# Patient Record
Sex: FEMALE | Race: WHITE | Employment: FULL TIME | URBAN - METROPOLITAN AREA
[De-identification: names, ages, dates, MRNs, and addresses within clinical notes are randomized per-mention and may not be internally consistent; named-entity substitution may affect disease eponyms.]

---

## 2017-02-14 ENCOUNTER — MYC REFILL (OUTPATIENT)
Dept: INTERNAL MEDICINE | Facility: CLINIC | Age: 52
End: 2017-02-14

## 2017-02-14 DIAGNOSIS — F90.2 ADHD (ATTENTION DEFICIT HYPERACTIVITY DISORDER), COMBINED TYPE: ICD-10-CM

## 2017-02-16 RX ORDER — DEXTROAMPHETAMINE SACCHARATE, AMPHETAMINE ASPARTATE, DEXTROAMPHETAMINE SULFATE AND AMPHETAMINE SULFATE 7.5; 7.5; 7.5; 7.5 MG/1; MG/1; MG/1; MG/1
30 TABLET ORAL 2 TIMES DAILY
Qty: 60 TABLET | Refills: 0 | Status: SHIPPED | OUTPATIENT
Start: 2017-02-16 | End: 2017-04-25

## 2017-04-25 ENCOUNTER — MYC REFILL (OUTPATIENT)
Dept: INTERNAL MEDICINE | Facility: CLINIC | Age: 52
End: 2017-04-25

## 2017-04-25 DIAGNOSIS — F90.2 ADHD (ATTENTION DEFICIT HYPERACTIVITY DISORDER), COMBINED TYPE: ICD-10-CM

## 2017-04-25 RX ORDER — DEXTROAMPHETAMINE SACCHARATE, AMPHETAMINE ASPARTATE, DEXTROAMPHETAMINE SULFATE AND AMPHETAMINE SULFATE 7.5; 7.5; 7.5; 7.5 MG/1; MG/1; MG/1; MG/1
30 TABLET ORAL 2 TIMES DAILY
Qty: 60 TABLET | Refills: 0 | Status: SHIPPED | OUTPATIENT
Start: 2017-04-25 | End: 2017-06-28

## 2017-04-25 NOTE — TELEPHONE ENCOUNTER
Message from MyChart:  Original authorizing provider: RICKY Wasserman CNP would like a refill of the following medications:  amphetamine-dextroamphetamine (ADDERALL) 30 MG per tablet [RICKY Wasserman CNP]    Preferred pharmacy: [place], [place] - [address] [place] [address] [phone]    Comment:

## 2017-06-22 ENCOUNTER — OFFICE VISIT (OUTPATIENT)
Dept: ORTHOPEDICS | Facility: CLINIC | Age: 52
End: 2017-06-22

## 2017-06-22 VITALS — HEIGHT: 68 IN | WEIGHT: 218 LBS | BODY MASS INDEX: 33.04 KG/M2 | RESPIRATION RATE: 16 BRPM

## 2017-06-22 DIAGNOSIS — M79.672 LEFT FOOT PAIN: Primary | ICD-10-CM

## 2017-06-22 NOTE — PROGRESS NOTES
Attending Note:   I have personally examined this patient and have reviewed the clinical presentation and progress note with the fellow. I agree with the treatment plan as outlined. The plan was formulated with the fellow on the day of the patient's visit. I have reviewed all imaging with the fellow and agree with the findings in the documentation.     Whit Green MD, CAQ, CCD  [facility]  Sports Medicine and Bone Health

## 2017-06-22 NOTE — PROGRESS NOTES
Subjective:   Unique Garrison is a 52 year old female who is here complaining of left foot pain. She had a previous stress fracture on 1st metatarsal 26 years ago when she had started running.  She has been doing HIIT workouts for about 2 years at the [employer]  2 months ago she switched from wearing running shoes to cross training shoes which have less support.  Since then she has had pain in her lateral distal foot over the base of her 3rd and 4th toes.  The pain has stayed the same since starting.  It is painful when she wears non supportive shoes.  She has backed off training some which does help some.      Background:   Date of injury: NA   Duration of symptoms: 2 months  Mechanism of Injury: Insidious Onset; Activity Related running from cross training  Aggravating factors:  unsupportive shoes, toeing off  Relieving Factors: rest  Prior Evaluation: None    PAST MEDICAL, SOCIAL, SURGICAL AND FAMILY HISTORY: She  has a past medical history of ADD (attention deficit disorder); Amblyopia; Anxiety; Depression; Insomnia; Perimenopausal; Tendonitis, bicipital (5/4/2012); and Uterine fibroid.  She  has a past surgical history that includes tonsillectomy; ORAL SURGERY SINGLE TOOTH; gum grafting; [place] screening (1/13/2014); Lasik customvue bilateral (1/17/2014); and Arthroscopy shoulder rotator cuff repair (Right, 2014).  Her family history includes Arthritis in her paternal grandmother; C.A.D. in her father; CANCER in her brother, brother, and mother; Depression in her sister; Hypertension in her father; Lipids in her father; OSTEOPOROSIS in her mother; Prostate Cancer in her father. There is no history of Thyroid Disease, Neurologic Disorder, or Musculoskeletal Disorder.  She reports that she quit smoking about 4 years ago. Her smoking use included Cigarettes. She has a 15.00 pack-year smoking history. She has never used smokeless tobacco. She reports that she drinks alcohol. She reports that she does not use illicit  "drugs.    ALLERGIES: She is allergic to morphine hcl.    CURRENT MEDICATIONS: She has a current medication list which includes the following prescription(s): amphetamine-dextroamphetamine, tretinoin, paroxetine, aluminum chloride, and progesterone.     REVIEW OF SYSTEMS:  CONSTITUTIONAL:NEGATIVE for fever, chills, change in weight  INTEGUMENTARY/SKIN: NEGATIVE for worrisome rashes, moles or lesions  MUSCULOSKELETAL:See HPI above  NEURO: NEGATIVE for weakness, dizziness or paresthesias  All other systems reviewed and negative.       Exam:   Resp 16  Ht 5' 7.75\" (1.721 m)  Wt 218 lb (98.9 kg)  BMI 33.39 kg/m2  CONSTITUTIONIAL: healthy, alert and no distress  HEENT: NC/AT, no scleral icterus  SKIN: no suspicious lesions or rashes  GAIT: normal  CARDIO: no LE edema  LUNGS: breathing non labored  ABD: soft, non obese  NEUROLOGIC: No focal neuro deficits  PSYCHIATRIC: affect normal/bright and mentation appears normal.      MUSCULOSKELETAL:   Left foot: dorsal swelling without warmth or erythema, tenderness over 3rd/ 4th metatarsal heads, no webspace tenderness, mild pain with metatarsal foot squeeze, painful toe extension but good strength, pain reproduced with single leg heel raise, negative hop test    Exam: 3 views of the left foot dated 6/22/2017.    COMPARISON: None.    CLINICAL HISTORY: Pain.    FINDINGS: AP, oblique, and lateral views of the left foot were  obtained. Bones are well aligned. The joint spaces are  well-maintained. Minimal spurring at the left first distal metatarsal.  No displaced fractures.             Impression             IMPRESSION: No acute bone abnormality in the left foot.    RACHEL SHAIKH MD      US left foot: mild tenosynovitis of extensor tendons 3r5d/ 4th digit, small metatarsal phalange jts 3rd and 4th     Assessment/Plan:   Left foot pain  -will get MRI to further evaluate; no evidence of stress reaction one Xray, US inconclusive   -pt placed into short walking boot today for " comfort  -icing prn  -likely refer to PT on follow up after MRI    Patient seen and examined with attending physician, Dr. Green.    Tamra Murillo DO  Primary Care Sports Medicine Fellow

## 2017-06-22 NOTE — MR AVS SNAPSHOT
After Visit Summary   6/22/2017    Unique Garrison    MRN: [id number]           Patient Information     Date Of Birth          1965        Visit Information        Provider Department      6/22/2017 2:40 PM Tamra Murillo MD [facility] Sports Medicine        Today's Diagnoses     Left foot pain    -  1       Follow-ups after your visit        Your next 10 appointments already scheduled     Jun 25, 2017  7:00 AM CDT   (Arrive by 6:45 AM)   MR FOOT LEFT W/O CONTRAST with [id number]   [facility] [place] [facility] ([facility])    [address] [place] [id number] [phone]           Take your medicines as usual, unless your doctor tells you not to. Bring a list of your current medicines to your exam (including vitamins, minerals and over-the-counter drugs). Also bring the results of similar scans you may have had.  Please remove any body piercings and hair extensions before you arrive.  Follow your doctor s orders. If you do not, we may have to postpone your exam.  You will not have contrast for this exam. You do not need to do anything special to prepare.  The MRI machine uses a strong magnet. Please wear clothes without metal (snaps, zippers). A sweatsuit works well, or we may give you a hospital gown.   **IMPORTANT** THE INSTRUCTIONS BELOW ARE ONLY FOR THOSE PATIENTS WHO HAVE BEEN TOLD THEY WILL RECEIVE SEDATION OR GENERAL ANESTHESIA DURING THEIR MRI PROCEDURE:  IF YOU WILL RECEIVE SEDATION (take medicine to help you relax during your exam):   You must get the medicine from your doctor before you arrive. Bring the medicine to the exam. Do not take it at home.   Arrive one hour early. Bring someone who can take you home after the test. Your medicine will make you sleepy. After the exam, you may not drive, take a bus or take a taxi by yourself.   No eating 8 hours before your exam. You may have clear liquids up until 4 hours before your exam. (Clear  liquids include water, clear tea, black coffee and fruit juice without pulp.)  IF YOU WILL RECEIVE ANESTHESIA (be asleep for your exam):   Arrive 1 1/2 hours early. Bring someone who can take you home after the test. You may not drive, take a bus or take a taxi by yourself.   No eating 8 hours before your exam. You may have clear liquids up until 4 hours before your exam. (Clear liquids include water, clear tea, black coffee and fruit juice without pulp.)   You will spend four to five hours in the recovery room.  Please call the Imaging Department at your exam site with any questions.            Jul 12, 2017  8:15 AM CDT   (Arrive by 8:00 AM)   Return Visit with Whit Green MD   [facility] ([facility])    [address] [place] [id number] [phone]              Future tests that were ordered for you today     Open Future Orders        Priority Expected Expires Ordered    MR Foot Left w/o Contrast Routine  6/22/2018 6/22/2017            Who to contact     Please call your clinic at [phone] to:    Ask questions about your health    Make or cancel appointments    Discuss your medicines    Learn about your test results    Speak to your doctor   If you have compliments or concerns about an experience at your clinic, or if you wish to file a complaint, please contact [facility] Patient Relations at [phone] or email us at [email or web address]         Additional Information About Your Visit        [employer] Information     [employer] gives you secure access to your electronic health record. If you see a primary care provider, you can also send messages to your care team and make appointments. If you have questions, please call your primary care clinic.  If you do not have a primary care provider, please call [phone] and they will assist you.      [employer] is an electronic gateway that  "provides easy, online access to your medical records. With Built In, you can request a clinic appointment, read your test results, renew a prescription or communicate with your care team.     To access your existing account, please contact your [facility] or call [phone] for assistance.        Care EveryWhere ID     This is your Care EveryWhere ID. This could be used by other organizations to access your [facility] medical records  [id number]        Your Vitals Were     Respirations Height BMI (Body Mass Index)             16 5' 7.75\" (1.721 m) 33.39 kg/m2          Blood Pressure from Last 3 Encounters:   05/03/16 167/78   01/08/15 135/84   06/11/13 141/75    Weight from Last 3 Encounters:   06/22/17 218 lb (98.9 kg)   05/03/16 214 lb 4.8 oz (97.2 kg)   03/17/16 205 lb (93 kg)               Primary Care Provider Office Phone # Fax #    Bria WARD Cristina, APRN [phone]       PR[facility] [address] [place] [phone]  [facility] [address]        Equal Access to Services     KAYDEN BHAKTA : Hadii aad ku hadasho Soomaali, waaxda luqadaha, qaybta kaalmada adedebrayada, gurwinder moffett . So [facility] [phone].    ATENCIÓN: Si habla español, tiene a randolph disposición servicios gratuitos de asistencia lingüística. Markame al [phone].    We comply with applicable federal civil rights laws and [place] laws. We do not discriminate on the basis of race, color, national origin, age, disability sex, sexual orientation or gender identity.            Thank you!     Thank you for choosing [facility] impok [facility]  for your care. Our goal is always to provide you with excellent care. Hearing back from our patients is one way we can continue to improve our services. Please take a few minutes to complete the written survey that you may receive in the mail after your visit with us. Thank you!             Your Updated Medication List - Protect others around " you: Learn how to safely use, store and throw away your medicines at www.disposemymeds.org.          This list is accurate as of: 6/22/17  3:50 PM.  Always use your most recent med list.                   Brand Name Dispense Instructions for use Diagnosis    aluminum chloride 20 % external solution    DRYSOL    120 mL    To improve effect, cover area of application with plastic wrap,  hold in place with tight shirt, and wash area in morning. As sweating improves, decrease use to 1-2 times weekly.    Hyperhidrosis       amphetamine-dextroamphetamine 30 MG per tablet    ADDERALL    60 tablet    Take 1 tablet (30 mg) by mouth 2 times daily Pt need to see primary for further refill.    ADHD (attention deficit hyperactivity disorder), combined type       melatonin 1 MG Tabs tablet      Take 1 mg by mouth nightly as needed for sleep        PARoxetine 40 MG tablet    PAXIL    30 tablet    Take 1 tablet (40 mg) by mouth every morning    Adjustment disorder with depressed mood       PROGESTERONE 100MG/G CREAM     30 g    Apply 0.5 g topically 2 times daily.        tretinoin 0.025 % cream    RETIN-A    45 g    Spread a pea size amount into affected area topically at bedtime.  Use sunscreen SPF>20.    Routine general medical examination at a health care facility       VITAMIN D (CHOLECALCIFEROL) PO      Take by mouth daily

## 2017-06-22 NOTE — LETTER
6/22/2017      RE: Unique Garrison  [address]  [facility] [address]        Subjective:   Unique Garrison is a 52 year old female who is here complaining of left foot pain. She had a previous stress fracture on 1st metatarsal 26 years ago when she had started running.  She has been doing HIIT workouts for about 2 years at the .  2 months ago she switched from wearing running shoes to cross training shoes which have less support.  Since then she has had pain in her lateral distal foot over the base of her 3rd and 4th toes.  The pain has stayed the same since starting.  It is painful when she wears non supportive shoes.  She has backed off training some which does help some.      Background:   Date of injury: NA   Duration of symptoms: 2 months  Mechanism of Injury: Insidious Onset; Activity Related running from cross training  Aggravating factors:  unsupportive shoes, toeing off  Relieving Factors: rest  Prior Evaluation: None    PAST MEDICAL, SOCIAL, SURGICAL AND FAMILY HISTORY: She  has a past medical history of ADD (attention deficit disorder); Amblyopia; Anxiety; Depression; Insomnia; Perimenopausal; Tendonitis, bicipital (5/4/2012); and Uterine fibroid.  She  has a past surgical history that includes tonsillectomy; ORAL SURGERY SINGLE TOOTH; gum grafting; [place] screening (1/13/2014); Lasik customvue bilateral (1/17/2014); and Arthroscopy shoulder rotator cuff repair (Right, 2014).  Her family history includes Arthritis in her paternal grandmother; C.A.D. in her father; CANCER in her brother, brother, and mother; Depression in her sister; Hypertension in her father; Lipids in her father; OSTEOPOROSIS in her mother; Prostate Cancer in her father. There is no history of Thyroid Disease, Neurologic Disorder, or Musculoskeletal Disorder.  She reports that she quit smoking about 4 years ago. Her smoking use included Cigarettes. She has a 15.00 pack-year smoking history. She has never used smokeless tobacco.  "She reports that she drinks alcohol. She reports that she does not use illicit drugs.    ALLERGIES: She is allergic to morphine hcl.    CURRENT MEDICATIONS: She has a current medication list which includes the following prescription(s): amphetamine-dextroamphetamine, tretinoin, paroxetine, aluminum chloride, and progesterone.     REVIEW OF SYSTEMS:  CONSTITUTIONAL:NEGATIVE for fever, chills, change in weight  INTEGUMENTARY/SKIN: NEGATIVE for worrisome rashes, moles or lesions  MUSCULOSKELETAL:See HPI above  NEURO: NEGATIVE for weakness, dizziness or paresthesias  All other systems reviewed and negative.       Exam:   Resp 16  Ht 5' 7.75\" (1.721 m)  Wt 218 lb (98.9 kg)  BMI 33.39 kg/m2  CONSTITUTIONIAL: healthy, alert and no distress  HEENT: NC/AT, no scleral icterus  SKIN: no suspicious lesions or rashes  GAIT: normal  CARDIO: no LE edema  LUNGS: breathing non labored  ABD: soft, non obese  NEUROLOGIC: No focal neuro deficits  PSYCHIATRIC: affect normal/bright and mentation appears normal.      MUSCULOSKELETAL:   Left foot: dorsal swelling without warmth or erythema, tenderness over 3rd/ 4th metatarsal heads, no webspace tenderness, mild pain with metatarsal foot squeeze, painful toe extension but good strength, pain reproduced with single leg heel raise, negative hop test    Exam: 3 views of the left foot dated 6/22/2017.    COMPARISON: None.    CLINICAL HISTORY: Pain.    FINDINGS: AP, oblique, and lateral views of the left foot were  obtained. Bones are well aligned. The joint spaces are  well-maintained. Minimal spurring at the left first distal metatarsal.  No displaced fractures.             Impression             IMPRESSION: No acute bone abnormality in the left foot.    RACHEL SHAIKH MD      US left foot: mild tenosynovitis of extensor tendons 3r5d/ 4th digit, small metatarsal phalange jts 3rd and 4th     Assessment/Plan:   Left foot pain  -will get MRI to further evaluate; no evidence of stress reaction " one Xray, US inconclusive   -pt placed into short walking boot today for comfort  -icing prn  -likely refer to PT on follow up after MRI    Patient seen and examined with attending physician, Dr. Green.    Tamra Murillo DO  Primary Care Sports Medicine Fellow      Attending Note:   I have personally examined this patient and have reviewed the clinical presentation and progress note with the fellow. I agree with the treatment plan as outlined. The plan was formulated with the fellow on the day of the patient's visit. I have reviewed all imaging with the fellow and agree with the findings in the documentation.     Whit Green MD, CAQ, CCD  [facility]  Sports Medicine and Bone Health

## 2017-06-28 ENCOUNTER — MYC REFILL (OUTPATIENT)
Dept: INTERNAL MEDICINE | Facility: CLINIC | Age: 52
End: 2017-06-28

## 2017-06-28 DIAGNOSIS — F90.2 ADHD (ATTENTION DEFICIT HYPERACTIVITY DISORDER), COMBINED TYPE: ICD-10-CM

## 2017-06-28 RX ORDER — DEXTROAMPHETAMINE SACCHARATE, AMPHETAMINE ASPARTATE, DEXTROAMPHETAMINE SULFATE AND AMPHETAMINE SULFATE 7.5; 7.5; 7.5; 7.5 MG/1; MG/1; MG/1; MG/1
30 TABLET ORAL 2 TIMES DAILY
Qty: 60 TABLET | Refills: 0 | Status: SHIPPED | OUTPATIENT
Start: 2017-06-28 | End: 2017-08-28

## 2017-06-28 NOTE — TELEPHONE ENCOUNTER
Message from MyChart:  Original authorizing provider: RICKY Wasserman CNP would like a refill of the following medications:  amphetamine-dextroamphetamine (ADDERALL) 30 MG per tablet [RICKY Wasserman CNP]    Preferred pharmacy: [place], [place] - [address] [place] [address] [phone]    Comment:

## 2017-07-12 ENCOUNTER — OFFICE VISIT (OUTPATIENT)
Dept: ORTHOPEDICS | Facility: CLINIC | Age: 52
End: 2017-07-12

## 2017-07-12 VITALS — RESPIRATION RATE: 16 BRPM | HEIGHT: 68 IN | WEIGHT: 218 LBS | BODY MASS INDEX: 33.04 KG/M2

## 2017-07-12 DIAGNOSIS — M79.672 LEFT FOOT PAIN: ICD-10-CM

## 2017-07-12 DIAGNOSIS — M79.674 PAIN OF TOE OF RIGHT FOOT: Primary | ICD-10-CM

## 2017-07-12 NOTE — PROGRESS NOTES
Attending Note:   I have personally examined this patient and have reviewed the clinical presentation and progress note with the fellow. I agree with the treatment plan as outlined. The plan was formulated with the fellow on the day of the patient's visit. I have reviewed all imaging with the fellow and agree with the findings in the documentation.     Whit Green MD, CAQ, CCD  [facility]  Sports Medicine and Bone Health

## 2017-07-12 NOTE — LETTER
7/12/2017      RE: Unique Garrison  [address]  [facility] [address]        Subjective:   Unique Garrison is a 52 year old female who is here complaining of left foot pain. She had a previous stress fracture on 1st metatarsal 26 years ago when she had started running.  She has been doing HIIT workouts for about 2 years at the .  2 months ago she switched from wearing running shoes to cross training shoes which have less support.  Since then she has had pain in her lateral distal foot over the base of her 3rd and 4th toes.  The pain has stayed the same since starting.  It is painful when she wears non supportive shoes.  She has backed off training some which does help some.      Background:   Date of injury: NA   Duration of symptoms: 2 months  Mechanism of Injury: Insidious Onset; Activity Related running from cross training  Aggravating factors:  unsupportive shoes, toeing off  Relieving Factors: rest  Prior Evaluation: None    R 3rd Toe Pain  - dropped a 1-2lb kitchen object on her toe a few days ago  - has mild pain with walking  - has been improving slowly    PAST MEDICAL, SOCIAL, SURGICAL AND FAMILY HISTORY: She  has a past medical history of ADD (attention deficit disorder); Amblyopia; Anxiety; Depression; Insomnia; Perimenopausal; Tendonitis, bicipital (5/4/2012); and Uterine fibroid.  She  has a past surgical history that includes tonsillectomy; ORAL SURGERY SINGLE TOOTH; gum grafting; Wavescan screening (1/13/2014); Lasik customvue bilateral (1/17/2014); and Arthroscopy shoulder rotator cuff repair (Right, 2014).  Her family history includes Arthritis in her paternal grandmother; C.A.D. in her father; CANCER in her brother, brother, and mother; Depression in her sister; Hypertension in her father; Lipids in her father; OSTEOPOROSIS in her mother; Prostate Cancer in her father. There is no history of Thyroid Disease, Neurologic Disorder, or Musculoskeletal Disorder.  She reports that she quit smoking  "about 4 years ago. Her smoking use included Cigarettes. She has a 15.00 pack-year smoking history. She has never used smokeless tobacco. She reports that she drinks alcohol. She reports that she does not use illicit drugs.    ALLERGIES: She is allergic to morphine hcl.    CURRENT MEDICATIONS: She has a current medication list which includes the following prescription(s): amphetamine-dextroamphetamine, cholecalciferol, melatonin, progesterone, tretinoin, paroxetine, and aluminum chloride.     REVIEW OF SYSTEMS:  CONSTITUTIONAL:NEGATIVE for fever, chills, change in weight  INTEGUMENTARY/SKIN: NEGATIVE for worrisome rashes, moles or lesions  MUSCULOSKELETAL:See HPI above  NEURO: NEGATIVE for weakness, dizziness or paresthesias  All other systems reviewed and negative.       Exam:   Resp 16  Ht 5' 7.75\" (1.721 m)  Wt 218 lb (98.9 kg)  BMI 33.39 kg/m2  CONSTITUTIONIAL: healthy, alert and no distress  HEENT: NC/AT, no scleral icterus  SKIN: no suspicious lesions or rashes  GAIT: normal  CARDIO: no LE edema  LUNGS: breathing non labored  ABD: soft, non obese  NEUROLOGIC: No focal neuro deficits  PSYCHIATRIC: affect normal/bright and mentation appears normal.    MUSCULOSKELETAL:   Left foot: TTP over the 4rth metatarsal head and base of the 2nd metatarsal.  No significant swelling or ecchymosis today  R Foot: Mild TTP over the 3rd distal phalanx      Exam: MR FOOT LEFT W/O CONTRAST     History: Left third and fourth metatarsal head pain, swelling.     Techniques: Multiplanar multisequence imaging through the left foot  was obtained without administration of intra-articular or intravenous  gadolinium contrast  using routine protocol.     Comparison: June 22, 2017.     Findings:     A dorsal marker is placed at the level of second/third metatarsal  heads. Underlying the marker, there is moderate amount of  intermetatarsal bursal fluid without associated interdigital neuroma.  There is moderate amount of intermetatarsal " bursal fluid also present  at the first and third interspaces without associated interdigital  neuroma.     There is mild marrow edema involving the fourth metatarsal including  distal shaft to the metatarsal head without associated fracture line,  query stress reaction. There is prominent focal marrow edema at the  dorsal aspect of the second metatarsal base, given the location  primary consideration include focal contusion. Small subchondral  cystic change at the cuboid, likely degenerative     Otherwise, no evidence of fracture, marrow contusion, or marrow  infiltrative change.     Bipartite tibial hallux sesamoid with associated mild cystic change  and edema. There is overlying mild subcutaneous soft tissue edema and  effacement of subcutaneous fat on T1 sequence, likely presenting  pressure callus. Similarly pressure callus noted at the fifth  metatarsal head plantar aspect.     Nonspecific dorsal subcutaneous soft tissue edema and mild swelling.     Small tenosynovial fluid surrounding the flexor hallucis longus  tendon. Otherwise, visualized courses of the flexor and extensor  tendons are intact.     Lisfranc interosseous ligament is intact.     No evidence of muscle atrophy.     Sesamoidal phalangeal and intersesamoidal ligaments of the first  metatarsophalangeal joint are intact. There are prominent recesses  versus degenerative tearing of the third, fourth as well as fifth  plantar plates at the metatarsophalangeal joints.      Small joint effusion in the first and fourth metatarsophalangeal  joints.         IMPRESSION:  1. Intermetatarsal bursitis of first through third interspaces with  marker corresponding to second interspace.  2. Fourth metatarsal distal shaft to metatarsal head stress reaction.  3. Prominent focal marrow edema at the dorsal aspect of the second  metatarsal base, given the location primary consideration include  focal contusion  4. Bipartite tibial hallux sesamoid with associated mild  cystic change  and edema, presumably degenerative/pseudoarthrosis.  5. Possible degenerative tearing of the third, fourth as well as fifth  plantar plates at the metatarsophalangeal joints.          JET MOYA       Assessment/Plan:   1) L 4rth Metatarsal Head Stress Rx  2) L 2nd Metatarsal Base Stress Rx  - stress rx as above seen on MRI and do correlate with exam today, suspect plate degeneration seen on MR is chronic and less contributory to her acute pain  - no impact activity in class, but OK to do non impact activities in her HIIT classes (discussed examples extensively)  - defer hard soled shoe / boot, supportive footwear only  - f/u in 4 weeks for re-eval and further clearance    3) R Third Proximal Phalanx Pain s/p mild trauma  - suspect contusion only, but check plain films  - will call with results and plan as needed    Patient seen and examined with attending physician, Dr. Green.    Edgar Smith DO  Primary Care Sports Medicine Fellow      Attending Note:   I have personally examined this patient and have reviewed the clinical presentation and progress note with the fellow. I agree with the treatment plan as outlined. The plan was formulated with the fellow on the day of the patient's visit. I have reviewed all imaging with the fellow and agree with the findings in the documentation.     Whit Green MD, CAQ, CCD  [facility]  Sports Medicine and Bone Health

## 2017-07-12 NOTE — MR AVS SNAPSHOT
After Visit Summary   7/12/2017    Unique Garrison    MRN: [id number]           Patient Information     Date Of Birth          1965        Visit Information        Provider Department      7/12/2017 8:20 AM Whit Green MD [facility]        Today's Diagnoses     Pain of toe of right foot    -  1    Left foot pain           Follow-ups after your visit        Your next 10 appointments already scheduled     Aug 25, 2017  4:40 PM CDT   (Arrive by 4:25 PM)   Return Visit with Whit Green MD   [facility]    [address] [address] [phone]              Who to contact     Please call your clinic at [phone] to:    Ask questions about your health    Make or cancel appointments    Discuss your medicines    Learn about your test results    Speak to your doctor   If you have compliments or concerns about an experience at your clinic, or if you wish to file a complaint, please contact [facility] Patient Relations at [phone] or email us at [email or web address]         Additional Information About Your Visit        MyChart Information     [employer] gives you secure access to your electronic health record. If you see a primary care provider, you can also send messages to your care team and make appointments. If you have questions, please call your primary care clinic.  If you do not have a primary care provider, please call [phone] and they will assist you.      [employer] is an electronic gateway that provides easy, online access to your medical records. With [employer], you can request a clinic appointment, read your test results, renew a prescription or communicate with your care team.     To access your existing account, please contact your [facility] or call [phone] for assistance.       "  Care EveryWhere ID     This is your Care EveryWhere ID. This could be used by other organizations to access your [place] medical records  [id number]        Your Vitals Were     Respirations Height BMI (Body Mass Index)             16 5' 7.75\" (1.721 m) 33.39 kg/m2          Blood Pressure from Last 3 Encounters:   05/03/16 167/78   01/08/15 135/84   06/11/13 141/75    Weight from Last 3 Encounters:   07/12/17 218 lb (98.9 kg)   06/22/17 218 lb (98.9 kg)   05/03/16 214 lb 4.8 oz (97.2 kg)               Primary Care Provider Office Phone # Fax #    Bria Evans, APRN -[phone]       [facility] [address] [place] [address] [address] [place] [id number]        Equal Access to Services     KAYDEN BHAKTA : Hadii aad ku hadasho Somundo, waaxda luqadaha, qaybta kaalmada adeegyada, gurwinder andrews hayjarad moffett . So [facility] [phone].    ATENCIÓN: Si habla español, tiene a randolph disposición servicios gratuitos de asistencia lingüística. Llame al [phone].    We comply with applicable federal civil rights laws and [place] laws. We do not discriminate on the basis of race, color, national origin, age, disability sex, sexual orientation or gender identity.            Thank you!     Thank you for choosing [facility]  for your care. Our goal is always to provide you with excellent care. Hearing back from our patients is one way we can continue to improve our services. Please take a few minutes to complete the written survey that you may receive in the mail after your visit with us. Thank you!             Your Updated Medication List - Protect others around you: Learn how to safely use, store and throw away your medicines at www.disposemymeds.org.          This list is accurate as of: 7/12/17 12:20 PM.  Always use your most recent med list.                   Brand Name Dispense Instructions for use Diagnosis    aluminum chloride 20 % external solution    DRYSOL    120 mL    To " improve effect, cover area of application with plastic wrap,  hold in place with tight shirt, and wash area in morning. As sweating improves, decrease use to 1-2 times weekly.    Hyperhidrosis       amphetamine-dextroamphetamine 30 MG per tablet    ADDERALL    60 tablet    Take 1 tablet (30 mg) by mouth 2 times daily Pt need to see primary for further refill.    ADHD (attention deficit hyperactivity disorder), combined type       melatonin 1 MG Tabs tablet      Take 1 mg by mouth nightly as needed for sleep        PARoxetine 40 MG tablet    PAXIL    30 tablet    Take 1 tablet (40 mg) by mouth every morning    Adjustment disorder with depressed mood       PROGESTERONE 100MG/G CREAM     30 g    Apply 0.5 g topically 2 times daily.        tretinoin 0.025 % cream    RETIN-A    45 g    Spread a pea size amount into affected area topically at bedtime.  Use sunscreen SPF>20.    Routine general medical examination at a health care facility       VITAMIN D (CHOLECALCIFEROL) PO      Take by mouth daily

## 2017-07-12 NOTE — PROGRESS NOTES
Subjective:   Unique Garrison is a 52 year old female who is here complaining of left foot pain. She had a previous stress fracture on 1st metatarsal 26 years ago when she had started running.  She has been doing HIIT workouts for about 2 years at the [employer].  2 months ago she switched from wearing running shoes to cross training shoes which have less support.  Since then she has had pain in her lateral distal foot over the base of her 3rd and 4th toes.  The pain has stayed the same since starting.  It is painful when she wears non supportive shoes.  She has backed off training some which does help some.      Background:   Date of injury: NA   Duration of symptoms: 2 months  Mechanism of Injury: Insidious Onset; Activity Related running from cross training  Aggravating factors:  unsupportive shoes, toeing off  Relieving Factors: rest  Prior Evaluation: None    R 3rd Toe Pain  - dropped a 1-2lb kitchen object on her toe a few days ago  - has mild pain with walking  - has been improving slowly    PAST MEDICAL, SOCIAL, SURGICAL AND FAMILY HISTORY: She  has a past medical history of ADD (attention deficit disorder); Amblyopia; Anxiety; Depression; Insomnia; Perimenopausal; Tendonitis, bicipital (5/4/2012); and Uterine fibroid.  She  has a past surgical history that includes tonsillectomy; ORAL SURGERY SINGLE TOOTH; gum grafting; Waves[place] screening (1/13/2014); Lasik customvue bilateral (1/17/2014); and Arthroscopy shoulder rotator cuff repair (Right, 2014).  Her family history includes Arthritis in her paternal grandmother; C.A.D. in her father; CANCER in her brother, brother, and mother; Depression in her sister; Hypertension in her father; Lipids in her father; OSTEOPOROSIS in her mother; Prostate Cancer in her father. There is no history of Thyroid Disease, Neurologic Disorder, or Musculoskeletal Disorder.  She reports that she quit smoking about 4 years ago. Her smoking use included Cigarettes. She has a 15.00 pack-year  "smoking history. She has never used smokeless tobacco. She reports that she drinks alcohol. She reports that she does not use illicit drugs.    ALLERGIES: She is allergic to morphine hcl.    CURRENT MEDICATIONS: She has a current medication list which includes the following prescription(s): amphetamine-dextroamphetamine, cholecalciferol, melatonin, progesterone, tretinoin, paroxetine, and aluminum chloride.     REVIEW OF SYSTEMS:  CONSTITUTIONAL:NEGATIVE for fever, chills, change in weight  INTEGUMENTARY/SKIN: NEGATIVE for worrisome rashes, moles or lesions  MUSCULOSKELETAL:See HPI above  NEURO: NEGATIVE for weakness, dizziness or paresthesias  All other systems reviewed and negative.       Exam:   Resp 16  Ht 5' 7.75\" (1.721 m)  Wt 218 lb (98.9 kg)  BMI 33.39 kg/m2  CONSTITUTIONIAL: healthy, alert and no distress  HEENT: NC/AT, no scleral icterus  SKIN: no suspicious lesions or rashes  GAIT: normal  CARDIO: no LE edema  LUNGS: breathing non labored  ABD: soft, non obese  NEUROLOGIC: No focal neuro deficits  PSYCHIATRIC: affect normal/bright and mentation appears normal.    MUSCULOSKELETAL:   Left foot: TTP over the 4rth metatarsal head and base of the 2nd metatarsal.  No significant swelling or ecchymosis today  R Foot: Mild TTP over the 3rd distal phalanx      Exam: MR FOOT LEFT W/O CONTRAST     History: Left third and fourth metatarsal head pain, swelling.     Techniques: Multiplanar multisequence imaging through the left foot  was obtained without administration of intra-articular or intravenous  gadolinium contrast  using routine protocol.     Comparison: June 22, 2017.     Findings:     A dorsal marker is placed at the level of second/third metatarsal  heads. Underlying the marker, there is moderate amount of  intermetatarsal bursal fluid without associated interdigital neuroma.  There is moderate amount of intermetatarsal bursal fluid also present  at the first and third interspaces without associated " interdigital  neuroma.     There is mild marrow edema involving the fourth metatarsal including  distal shaft to the metatarsal head without associated fracture line,  query stress reaction. There is prominent focal marrow edema at the  dorsal aspect of the second metatarsal base, given the location  primary consideration include focal contusion. Small subchondral  cystic change at the cuboid, likely degenerative     Otherwise, no evidence of fracture, marrow contusion, or marrow  infiltrative change.     Bipartite tibial hallux sesamoid with associated mild cystic change  and edema. There is overlying mild subcutaneous soft tissue edema and  effacement of subcutaneous fat on T1 sequence, likely presenting  pressure callus. Similarly pressure callus noted at the fifth  metatarsal head plantar aspect.     Nonspecific dorsal subcutaneous soft tissue edema and mild swelling.     Small tenosynovial fluid surrounding the flexor hallucis longus  tendon. Otherwise, visualized courses of the flexor and extensor  tendons are intact.     Lisfranc interosseous ligament is intact.     No evidence of muscle atrophy.     Sesamoidal phalangeal and intersesamoidal ligaments of the first  metatarsophalangeal joint are intact. There are prominent recesses  versus degenerative tearing of the third, fourth as well as fifth  plantar plates at the metatarsophalangeal joints.      Small joint effusion in the first and fourth metatarsophalangeal  joints.         IMPRESSION:  1. Intermetatarsal bursitis of first through third interspaces with  marker corresponding to second interspace.  2. Fourth metatarsal distal shaft to metatarsal head stress reaction.  3. Prominent focal marrow edema at the dorsal aspect of the second  metatarsal base, given the location primary consideration include  focal contusion  4. Bipartite tibial hallux sesamoid with associated mild cystic change  and edema, presumably degenerative/pseudoarthrosis.  5. Possible  degenerative tearing of the third, fourth as well as fifth  plantar plates at the metatarsophalangeal joints.          JET MOYA       Assessment/Plan:   1) L 4rth Metatarsal Head Stress Rx  2) L 2nd Metatarsal Base Stress Rx  - stress rx as above seen on MRI and do correlate with exam today, suspect plate degeneration seen on MR is chronic and less contributory to her acute pain  - no impact activity in class, but OK to do non impact activities in her HIIT classes (discussed examples extensively)  - defer hard soled shoe / boot, supportive footwear only  - f/u in 4 weeks for re-eval and further clearance    3) R Third Proximal Phalanx Pain s/p mild trauma  - suspect contusion only, but check plain films  - will call with results and plan as needed    Patient seen and examined with attending physician, Dr. Green.    Edgar Smith DO  Primary Care Sports Medicine Fellow

## 2017-08-25 ENCOUNTER — OFFICE VISIT (OUTPATIENT)
Dept: ORTHOPEDICS | Facility: CLINIC | Age: 52
End: 2017-08-25

## 2017-08-25 DIAGNOSIS — M84.30XA STRESS REACTION OF BONE: ICD-10-CM

## 2017-08-25 NOTE — PROGRESS NOTES
" Subjective:   Unique Garrison is a 52 year old female who is here to follow up on left foot pain and fracture of right toe. Previous stress fracture 26 years ago. Has been doing HIIT workouts for 2 years at the . ~ 3 months ago changed shoes and noted pain over lateral distal foot. Pain has stayed the same. MRI revealed stress reaction at 4th and 2nd metatarsals.     Since being seen wore boot for one week but had to stop 2/2 pain in right 3rd toe which turned out to be a distal tuft fracture. No longer has pain.  She has worn shoes occasionally with a mild incline (\"not high heels\") but this caused significant incraese in pain. She ran on Wednesday and did rowing exercises without discomfort.     Has been doing squats and lunges as well, not painful recently but did hurt in past with it.    Background:   Duration of symptoms: 3 months  Mechanism of Injury: Insidious Onset; Activity Related running from cross training  Aggravating factors:  unsupportive shoes, toeing off  Relieving Factors: rest  Prior Evaluation: None    PAST MEDICAL, SOCIAL, SURGICAL AND FAMILY HISTORY: She  has a past medical history of ADD (attention deficit disorder); Amblyopia; Anxiety; Depression; Insomnia; Perimenopausal; Tendonitis, bicipital (5/4/2012); and Uterine fibroid.  She  has a past surgical history that includes tonsillectomy; ORAL SURGERY SINGLE TOOTH; gum grafting; Waves[place] screening (1/13/2014); Lasik customvue bilateral (1/17/2014); and Arthroscopy shoulder rotator cuff repair (Right, 2014).  Her family history includes Arthritis in her paternal grandmother; C.A.D. in her father; CANCER in her brother, brother, and mother; Depression in her sister; Hypertension in her father; Lipids in her father; OSTEOPOROSIS in her mother; Prostate Cancer in her father. There is no history of Thyroid Disease, Neurologic Disorder, or Musculoskeletal Disorder.  She reports that she quit smoking about 4 years ago. Her smoking use included " Cigarettes. She has a 15.00 pack-year smoking history. She has never used smokeless tobacco. She reports that she drinks alcohol. She reports that she does not use illicit drugs.    ALLERGIES: She is allergic to morphine hcl.    CURRENT MEDICATIONS: She has a current medication list which includes the following prescription(s): amphetamine-dextroamphetamine, cholecalciferol, melatonin, progesterone, tretinoin, paroxetine, and aluminum chloride.     REVIEW OF SYSTEMS:  CONSTITUTIONAL:NEGATIVE for fever, chills, change in weight  INTEGUMENTARY/SKIN: NEGATIVE for worrisome rashes, moles or lesions  MUSCULOSKELETAL:See HPI above  NEURO: NEGATIVE for weakness, dizziness or paresthesias  All other systems reviewed and negative.       Exam:   There were no vitals taken for this visit.  CONSTITUTIONIAL: healthy, alert and no distress  HEENT: NC/AT, no scleral icterus  SKIN: no suspicious lesions or rashes  GAIT: normal  CARDIO: no LE edema  LUNGS: breathing non labored  ABD: soft, non obese  NEUROLOGIC: No focal neuro deficits  PSYCHIATRIC: affect normal/bright and mentation appears normal.    MUSCULOSKELETAL:   L foot: TTP over the 3rd 4th metatarsal head, negative squeeze of metacarpals, mild tenderness on medial 2nd metatarsal. No swelling, no ecchymosis   R Foot: not tender       Assessment/Plan:   1) L 4th Metatarsal Head Stress Rx  2) L 2nd Metatarsal Base Stress Rx  - patient has been relatively noncompliant in treatment over last 4 weeks. Generalized tenderness, pain has improved with rest.   - no impact activity, okay to continue non impact exercise activity, discussed in detail today.   - does not need to continue with boot, but recommended a hard soled shoe, avoid flip flops and raised soled shoes  - urged to quit smoking  - f/u in 4 weeks for re-eval and further clearance  - if not improving consider further imaging/lab eval for bone health    3) R Third Proximal Phalanx fracture  Clinically healed, will defer  imaging until future date    Patient seen and examined with attending physician, Dr. Green.    Anthony Arredondo PGY-3  Visiting Resident

## 2017-08-25 NOTE — LETTER
"  8/25/2017      RE: Unique Garrison  [address]  [facility] [address]        Subjective:   Unique Garrison is a 52 year old female who is here to follow up on left foot pain and fracture of right toe. Previous stress fracture 26 years ago. Has been doing HIIT workouts for 2 years at the . ~ 3 months ago changed shoes and noted pain over lateral distal foot. Pain has stayed the same. MRI revealed stress reaction at 4th and 2nd metatarsals.     Since being seen wore boot for one week but had to stop 2/2 pain in right 3rd toe which turned out to be a distal tuft fracture. No longer has pain.  She has worn shoes occasionally with a mild incline (\"not high heels\") but this caused significant incraese in pain. She ran on Wednesday and did rowing exercises without discomfort.     Has been doing squats and lunges as well, not painful recently but did hurt in past with it.    Background:   Duration of symptoms: 3 months  Mechanism of Injury: Insidious Onset; Activity Related running from cross training  Aggravating factors:  unsupportive shoes, toeing off  Relieving Factors: rest  Prior Evaluation: None    PAST MEDICAL, SOCIAL, SURGICAL AND FAMILY HISTORY: She  has a past medical history of ADD (attention deficit disorder); Amblyopia; Anxiety; Depression; Insomnia; Perimenopausal; Tendonitis, bicipital (5/4/2012); and Uterine fibroid.  She  has a past surgical history that includes tonsillectomy; ORAL SURGERY SINGLE TOOTH; gum grafting; [place] screening (1/13/2014); Lasik customvue bilateral (1/17/2014); and Arthroscopy shoulder rotator cuff repair (Right, 2014).  Her family history includes Arthritis in her paternal grandmother; C.A.D. in her father; CANCER in her brother, brother, and mother; Depression in her sister; Hypertension in her father; Lipids in her father; OSTEOPOROSIS in her mother; Prostate Cancer in her father. There is no history of Thyroid Disease, Neurologic Disorder, or Musculoskeletal Disorder.  " She reports that she quit smoking about 4 years ago. Her smoking use included Cigarettes. She has a 15.00 pack-year smoking history. She has never used smokeless tobacco. She reports that she drinks alcohol. She reports that she does not use illicit drugs.    ALLERGIES: She is allergic to morphine hcl.    CURRENT MEDICATIONS: She has a current medication list which includes the following prescription(s): amphetamine-dextroamphetamine, cholecalciferol, melatonin, progesterone, tretinoin, paroxetine, and aluminum chloride.     REVIEW OF SYSTEMS:  CONSTITUTIONAL:NEGATIVE for fever, chills, change in weight  INTEGUMENTARY/SKIN: NEGATIVE for worrisome rashes, moles or lesions  MUSCULOSKELETAL:See HPI above  NEURO: NEGATIVE for weakness, dizziness or paresthesias  All other systems reviewed and negative.       Exam:   There were no vitals taken for this visit.  CONSTITUTIONIAL: healthy, alert and no distress  HEENT: NC/AT, no scleral icterus  SKIN: no suspicious lesions or rashes  GAIT: normal  CARDIO: no LE edema  LUNGS: breathing non labored  ABD: soft, non obese  NEUROLOGIC: No focal neuro deficits  PSYCHIATRIC: affect normal/bright and mentation appears normal.    MUSCULOSKELETAL:   L foot: TTP over the 3rd 4th metatarsal head, negative squeeze of metacarpals, mild tenderness on medial 2nd metatarsal. No swelling, no ecchymosis   R Foot: not tender       Assessment/Plan:   1) L 4th Metatarsal Head Stress Rx  2) L 2nd Metatarsal Base Stress Rx  - patient has been relatively noncompliant in treatment over last 4 weeks. Generalized tenderness, pain has improved with rest.   - no impact activity, okay to continue non impact exercise activity, discussed in detail today.   - does not need to continue with boot, but recommended a hard soled shoe, avoid flip flops and raised soled shoes  - urged to quit smoking  - f/u in 4 weeks for re-eval and further clearance  - if not improving consider further imaging/lab eval for bone  health    3) R Third Proximal Phalanx fracture  Clinically healed, will defer imaging until future date    Patient seen and examined with attending physician, Dr. Green.    Anthony Arredondo PGY-3  Visiting Resident    Attending Note:   I have personally examined this patient and have reviewed the clinical presentation and progress note with the resident.  I agree with the treatment plan as outlined. The plan was formulated with the resident on the day of the patient's visit. I have reviewed all imaging with the resident and agree with the findings in the documentation.     Whit Green MD, CAQ, CCD  [facility]  Sports Medicine and Bone Health

## 2017-08-25 NOTE — MR AVS SNAPSHOT
After Visit Summary   8/25/2017    Unique Garrison    MRN: [id number]           Patient Information     Date Of Birth          1965        Visit Information        Provider Department      8/25/2017 4:40 PM Whit Green MD [facility] Sports Medicine        Today's Diagnoses     Stress reaction of bone           Follow-ups after your visit        Your next 10 appointments already scheduled     Sep 12, 2017  2:00 PM CDT   (Arrive by 1:45 PM)   PHYSICAL with RICKY Ferrera [facility] ([facility])    [address] [place] [address]  [address] [facility] [address] [phone]            Sep 27, 2017  3:40 PM CDT   (Arrive by 3:25 PM)   Return Visit with Whit Green MD   [facility] ([facility])    [address] [place] [address]  [address] [facility] [address] [phone]              Who to contact     Please call your clinic at [phone] to:    Ask questions about your health    Make or cancel appointments    Discuss your medicines    Learn about your test results    Speak to your doctor   If you have compliments or concerns about an experience at your clinic, or if you wish to file a complaint, please contact [facility] Patient Relations at [phone] or email us at [email or web address]         Additional Information About Your Visit        MyChart Information     [email or web address] gives you secure access to your electronic health record. If you see a primary care provider, you can also send messages to your care team and make appointments. If you have questions, please call your primary care clinic.  If you do not have a primary care provider, please call [phone] and they will assist you.      [email or web address] is an electronic gateway that provides easy, online access to your medical records. With [email or web address], you can request a clinic appointment, read  your test results, renew a prescription or communicate with your care team.     To access your existing account, please contact your [facility] or call [phone] for assistance.        Care EveryWhere ID     This is your Care EveryWhere ID. This could be used by other organizations to access your [place] medical records  [id number]         Blood Pressure from Last 3 Encounters:   05/03/16 167/78   01/08/15 135/84   06/11/13 141/75    Weight from Last 3 Encounters:   07/12/17 98.9 kg (218 lb)   06/22/17 98.9 kg (218 lb)   05/03/16 97.2 kg (214 lb 4.8 oz)              Today, you had the following     No orders found for display       Primary Care Provider Office Phone # Fax #    Bria WARD Moisésgodfreylexus APRKEON [phone]       [address] [place] [address]  [facility] [address]        Equal Access to Services     KAYDEN BHAKTA : Hadii aad ku hadasho Socarolali, waaxda luqadaha, qaybta kaalmada adeegyada, waxay mandiin hayjarad moffett . So [facility] [phone].    ATENCIÓN: Si habla español, tiene a rnadolph disposición servicios gratuitos de asistencia lingüística. Llame al [phone].    We comply with applicable federal civil rights laws and [place] laws. We do not discriminate on the basis of race, color, national origin, age, disability sex, sexual orientation or gender identity.            Thank you!     Thank you for choosing [facility]  for your care. Our goal is always to provide you with excellent care. Hearing back from our patients is one way we can continue to improve our services. Please take a few minutes to complete the written survey that you may receive in the mail after your visit with us. Thank you!             Your Updated Medication List - Protect others around you: Learn how to safely use, store and throw away your medicines at www.disposemymeds.org.          This list is accurate as of: 8/25/17 11:59 PM.  Always use your most recent med  list.                   Brand Name Dispense Instructions for use Diagnosis    aluminum chloride 20 % external solution    DRYSOL    120 mL    To improve effect, cover area of application with plastic wrap,  hold in place with tight shirt, and wash area in morning. As sweating improves, decrease use to 1-2 times weekly.    Hyperhidrosis       melatonin 1 MG Tabs tablet      Take 1 mg by mouth nightly as needed for sleep        PARoxetine 40 MG tablet    PAXIL    30 tablet    Take 1 tablet (40 mg) by mouth every morning    Adjustment disorder with depressed mood       PROGESTERONE 100MG/G CREAM     30 g    Apply 0.5 g topically 2 times daily.        tretinoin 0.025 % cream    RETIN-A    45 g    Spread a pea size amount into affected area topically at bedtime.  Use sunscreen SPF>20.    Routine general medical examination at a health care facility       VITAMIN D (CHOLECALCIFEROL) PO      Take by mouth daily

## 2017-08-28 ENCOUNTER — MYC REFILL (OUTPATIENT)
Dept: INTERNAL MEDICINE | Facility: CLINIC | Age: 52
End: 2017-08-28

## 2017-08-28 DIAGNOSIS — F90.2 ADHD (ATTENTION DEFICIT HYPERACTIVITY DISORDER), COMBINED TYPE: ICD-10-CM

## 2017-08-28 NOTE — TELEPHONE ENCOUNTER
Message from MyChart:  Original authorizing provider: RICKY Wasserman CNP would like a refill of the following medications:  amphetamine-dextroamphetamine (ADDERALL) 30 MG per tablet [RCIKY Wasserman CNP]    Preferred pharmacy: [address]    Comment:  I have a scheduled appt for a physical on Sept 12 (the soonest I could get) but not enough left of the medication to wait until my appointment

## 2017-08-28 NOTE — TELEPHONE ENCOUNTER
Date last refill was ordered:6/28/17  Quantity ordered:60  Number of refills:0  Date of last clinic visit:5/3/16  Date of next clinic visit:9/12/17

## 2017-08-29 RX ORDER — DEXTROAMPHETAMINE SACCHARATE, AMPHETAMINE ASPARTATE, DEXTROAMPHETAMINE SULFATE AND AMPHETAMINE SULFATE 7.5; 7.5; 7.5; 7.5 MG/1; MG/1; MG/1; MG/1
30 TABLET ORAL 2 TIMES DAILY
Qty: 60 TABLET | Refills: 0 | Status: SHIPPED | OUTPATIENT
Start: 2017-08-29 | End: 2017-09-12

## 2017-08-29 NOTE — PROGRESS NOTES
Attending Note:   I have personally examined this patient and have reviewed the clinical presentation and progress note with the resident.  I agree with the treatment plan as outlined. The plan was formulated with the resident on the day of the patient's visit. I have reviewed all imaging with the resident and agree with the findings in the documentation.     hWit Green MD, CAQ, CCD  [facility]  Sports Medicine and Bone Health

## 2017-09-03 ENCOUNTER — HEALTH MAINTENANCE LETTER (OUTPATIENT)
Age: 52
End: 2017-09-03

## 2017-09-06 ASSESSMENT — ENCOUNTER SYMPTOMS
NIGHT SWEATS: 1
ARTHRALGIAS: 1

## 2017-09-12 ENCOUNTER — OFFICE VISIT (OUTPATIENT)
Dept: INTERNAL MEDICINE | Facility: CLINIC | Age: 52
End: 2017-09-12

## 2017-09-12 ENCOUNTER — TELEPHONE (OUTPATIENT)
Dept: GASTROENTEROLOGY | Facility: CLINIC | Age: 52
End: 2017-09-12

## 2017-09-12 VITALS — DIASTOLIC BLOOD PRESSURE: 87 MMHG | SYSTOLIC BLOOD PRESSURE: 153 MMHG | HEART RATE: 83 BPM | RESPIRATION RATE: 16 BRPM

## 2017-09-12 DIAGNOSIS — Z13.1 SCREENING FOR DIABETES MELLITUS: ICD-10-CM

## 2017-09-12 DIAGNOSIS — E78.00 ELEVATED CHOLESTEROL: ICD-10-CM

## 2017-09-12 DIAGNOSIS — F90.2 ADHD (ATTENTION DEFICIT HYPERACTIVITY DISORDER), COMBINED TYPE: ICD-10-CM

## 2017-09-12 DIAGNOSIS — Z12.31 ENCOUNTER FOR SCREENING MAMMOGRAM FOR BREAST CANCER: ICD-10-CM

## 2017-09-12 DIAGNOSIS — Z12.11 SPECIAL SCREENING FOR MALIGNANT NEOPLASMS, COLON: Primary | ICD-10-CM

## 2017-09-12 DIAGNOSIS — Z92.29 HISTORY OF TETANUS, DIPHTHERIA, AND ACELLULAR PERTUSSIS BOOSTER VACCINATION (TDAP): ICD-10-CM

## 2017-09-12 RX ORDER — DEXTROAMPHETAMINE SACCHARATE, AMPHETAMINE ASPARTATE, DEXTROAMPHETAMINE SULFATE AND AMPHETAMINE SULFATE 7.5; 7.5; 7.5; 7.5 MG/1; MG/1; MG/1; MG/1
30 TABLET ORAL 2 TIMES DAILY
Qty: 60 TABLET | Refills: 0 | Status: SHIPPED | OUTPATIENT
Start: 2017-09-29 | End: 2018-01-03

## 2017-09-12 ASSESSMENT — PAIN SCALES - GENERAL: PAINLEVEL: MILD PAIN (2)

## 2017-09-12 NOTE — MR AVS SNAPSHOT
After Visit Summary   9/12/2017    Unique Garrison    MRN: [id number]           Patient Information     Date Of Birth          1965        Visit Information        Provider Department      9/12/2017 2:00 PM Bria Evans, APRN CNP [facility]        Today's Diagnoses     Special screening for malignant neoplasms, colon    -  1    Encounter for screening mammogram for breast cancer        ADHD (attention deficit hyperactivity disorder), combined type        History of tetanus, diphtheria, and acellular pertussis booster vaccination (Tdap)        Elevated cholesterol        Screening for diabetes mellitus          Care Instructions    Primary Care Center Phone Number [phone]  Primary Care Center Medication Refill Request Information:  * Please contact your pharmacy regarding ANY request for medication refills.  ** [facility] Prescription Fax = [phone]  * Please allow 3 business days for routine medication refills.  * Please allow 5 business days for controlled substance medication refills.     Primary Care Center Test Result notification information:  *You will be notified with in 7-10 days of your appointment day regarding the results of your test.  If you are on MyChart you will be notified as soon as the provider has reviewed the results and signed off on them.        Please schedule the following appointments:  Mammogram:  Breast Center ([facility]) [phone] ([facility] 2nd Floor)  Breast Center ([facility]) [phone] ([address])             Follow-ups after your visit        Additional Services     GI Procedure Referral       Last Lab Result: Creatinine (mg/dL)       Date                     Value                 05/03/2016               0.88             ----------  There is no height or weight on file to calculate BMI.     Needed:  No  Language:  English    Patient will be contacted to schedule procedure.     Please be aware that  coverage of these services is subject to the terms and limitations of your health insurance plan.  Call member services at your health plan with any benefit or coverage questions.  Any procedures must be performed at a [place] facility OR coordinated by your clinic's referral office.    Please bring the following with you to your appointment:    (1) Any X-Rays, CTs or MRIs which have been performed.  Contact the facility where they were done to arrange for  prior to your scheduled appointment.    (2) List of current medications   (3) This referral request   (4) Any documents/labs given to you for this referral                  Your next 10 appointments already scheduled     Sep 27, 2017  3:40 PM CDT   (Arrive by 3:25 PM)   Return Visit with Whit Green MD   [facility] ([facility])    [address] [place] [id number] [phone]              Future tests that were ordered for you today     Open Future Orders        Priority Expected Expires Ordered    Basic metabolic panel Routine 11/30/2017 9/12/2018 9/12/2017    Lipid panel reflex to direct LDL Routine 11/30/2017 9/12/2018 9/12/2017    Mammogram, routine screening Routine  9/12/2018 9/12/2017            Who to contact     Please call your clinic at [phone] to:    Ask questions about your health    Make or cancel appointments    Discuss your medicines    Learn about your test results    Speak to your doctor   If you have compliments or concerns about an experience at your clinic, or if you wish to file a complaint, please contact [facility] Patient Relations at [phone] or email us at [email or web address]         Additional Information About Your Visit        MyChart Information     [employer] gives you secure access to your electronic health record. If you see a primary care provider, you can also send messages to your care team  and make appointments. If you have questions, please call your primary care clinic.  If you do not have a primary care provider, please call [phone] and they will assist you.      [employer] is an electronic gateway that provides easy, online access to your medical records. With [employer], you can request a clinic appointment, read your test results, renew a prescription or communicate with your care team.     To access your existing account, please contact your [facility] or call [phone] for assistance.        Care EveryWhere ID     This is your Care EveryWhere ID. This could be used by other organizations to access your [place] medical records  [id number]        Your Vitals Were     Pulse Respirations Breastfeeding?             83 16 No          Blood Pressure from Last 3 Encounters:   09/12/17 153/87   05/03/16 167/78   01/08/15 135/84    Weight from Last 3 Encounters:   07/12/17 98.9 kg (218 lb)   06/22/17 98.9 kg (218 lb)   05/03/16 97.2 kg (214 lb 4.8 oz)              We Performed the Following     GI Procedure Referral     TDAP VACCINE (BOOSTRIX)          Today's Medication Changes          These changes are accurate as of: 9/12/17  3:15 PM.  If you have any questions, ask your nurse or doctor.               Stop taking these medicines if you haven't already. Please contact your care team if you have questions.     aluminum chloride 20 % external solution   Commonly known as:  DRYSOL   Stopped by:  Bria Evans APRN CNP                Where to get your medicines      Some of these will need a paper prescription and others can be bought over the counter.  Ask your nurse if you have questions.     Bring a paper prescription for each of these medications     amphetamine-dextroamphetamine 30 MG per tablet                Primary Care Provider Office Phone # Fax #    RICKY Ferrera [phone]       [address] [place] [address]  [id number]        Equal Access to Services     [facility]: Hadii andrew corrales tish Whitaker, wakaylada luqglynn, qajulian kabiankajaylen dudley, gurwinder glasgow. So [facility] [phone].    ATENCIÓN: Si habla español, tiene a randolph disposición servicios gratuitos de asistencia lingüística. Llame al [phone].    We comply with applicable federal civil rights laws and [place] laws. We do not discriminate on the basis of race, color, national origin, age, disability sex, sexual orientation or gender identity.            Thank you!     Thank you for choosing [facility]  for your care. Our goal is always to provide you with excellent care. Hearing back from our patients is one way we can continue to improve our services. Please take a few minutes to complete the written survey that you may receive in the mail after your visit with us. Thank you!             Your Updated Medication List - Protect others around you: Learn how to safely use, store and throw away your medicines at www.disposemymeds.org.          This list is accurate as of: 9/12/17  3:15 PM.  Always use your most recent med list.                   Brand Name Dispense Instructions for use Diagnosis    amphetamine-dextroamphetamine 30 MG per tablet   Start taking on:  9/29/2017    ADDERALL    60 tablet    Take 1 tablet (30 mg) by mouth 2 times daily Pt need to see primary for further refill.    ADHD (attention deficit hyperactivity disorder), combined type       melatonin 1 MG Tabs tablet      Take 1 mg by mouth nightly as needed for sleep        PARoxetine 40 MG tablet    PAXIL    30 tablet    Take 1 tablet (40 mg) by mouth every morning    Adjustment disorder with depressed mood       PROGESTERONE 100MG/G CREAM     30 g    Apply 0.5 g topically 2 times daily.        tretinoin 0.025 % cream    RETIN-A    45 g    Spread a pea size amount into affected area topically at bedtime.  Use sunscreen SPF>20.    Routine general medical  examination at a health care facility       VITAMIN D (CHOLECALCIFEROL) PO      Take by mouth daily

## 2017-09-12 NOTE — PROGRESS NOTES
HPI:  Unique Garrison is a 51 year old female who presents to clinic today for a preventative exam.  Her last visit was 1/2015.  She never scheduled a mammo or colonoscopy after that visit. But she is moving into her own apartment in 2 weeks and will be able to do a colon prep so agrees to be scheduled for a colonoscopy.    She is due for a mammogram.    She has had a chronic foot issue so it has limited her work out sessions with her friends.  She cannot run on her foot yet.    She has experienced some left wrist discomfort over an area of previous fracture.       She has the following medical issues :   Patient Active Problem List   Diagnosis     ADHD (attention deficit hyperactivity disorder), diagnosed 2010     Adjustment disorder with depressed mood     Obesity     Calcaneal spur, left     History of iron deficiency anemia     Pain in joint, shoulder region     Medication refill- do not delete      Cobblestone retinal degeneration     Myopia     Occlusion amblyopia     Lumbar strain     Stress reaction of bone       MEDICATIONS:  Current Outpatient Prescriptions   Medication Sig Dispense Refill     amphetamine-dextroamphetamine (ADDERALL) 30 MG per tablet Take 1 tablet (30 mg) by mouth 2 times daily Pt need to see primary for further refill. 60 tablet 0     VITAMIN D, CHOLECALCIFEROL, PO Take by mouth daily       melatonin 1 MG TABS tablet Take 1 mg by mouth nightly as needed for sleep       PARoxetine (PAXIL) 40 MG tablet Take 1 tablet (40 mg) by mouth every morning 30 tablet 11     PROGESTERONE 100MG/G CREAM Apply 0.5 g topically 2 times daily. 30 g 6     tretinoin (RETIN-A) 0.025 % cream Spread a pea size amount into affected area topically at bedtime.  Use sunscreen SPF>20. (Patient not taking: Reported on 9/12/2017) 45 g 11       ALLERGIES: Morphine hcl    PAST MEDICAL HX:   Past Medical History:   Diagnosis Date     ADD (attention deficit disorder)      Amblyopia     left eye     Anxiety      Depression      Resolved     Insomnia      Perimenopausal      Tendonitis, bicipital 5/4/2012    left     Uterine fibroid        PAST SURGICAL HX:   Past Surgical History:   Procedure Laterality Date     ARTHROSCOPY SHOULDER ROTATOR CUFF REPAIR Right 2014     C ORAL SURGERY SINGLE TOOTH       gum grafting       LASIK CUSTOMVUE BILATERAL  1/17/2014    Procedure: LASIK CUSTOMVUE BILATERAL;  BILATERAL CUSTOMVUE LASIK WITH INTRALASE ;  Surgeon: Juve Hill MD;  Location: [facility]     TONSILLECTOMY       WAVESCAN SCREENING  1/13/2014    Procedure: WAVESCAN SCREENING;  BILATERAL WAVESCAN ;  Surgeon: Juve Hill MD;  Location: [facility]       FAMILY MEDICAL HX:   Family History   Problem Relation Age of Onset     C.A.D. Father      angioplasty x 2     CANCER Brother      lymphoma  30s     Prostate Cancer Father      Arthritis Paternal Grandmother      Depression Sister      Hypertension Father      Lipids Father      OSTEOPOROSIS Mother      Thyroid Disease No family hx of      Neurologic Disorder No family hx of      Musculoskeletal Disorder No family hx of      CANCER Mother      lymphoma     CANCER Brother        IMMUNIZATION HX:   Immunization History   Administered Date(s) Administered     Influenza (H1N1) 02/12/2010     Influenza (IIV3) 10/18/2011, 10/16/2014, 10/02/2015     Tdap (Adacel,Boostrix) 11/27/2007       SOCIAL HX:   History     Social History     Marital Status: Single     Spouse Name: N/A     Number of Children: N/A     Years of Education: N/A     Social History Main Topics     Smoking status: Former Smoker -- 0.50 packs/day for 30 years     Types: Cigarettes     Quit date: 05/30/2013     Smokeless tobacco: Never Used      Comment: stopped 2 weeks ago     Alcohol Use: Yes      Comment: 5-7 beers/week     Drug Use: No     Sexual Activity:     Partners: Male     Other Topics Concern      Service No     Blood Transfusions No     Caffeine Concern No     Occupational Exposure No     Hobby Hazards  "No     Exercise Yes     Seat Belt Yes     Self-Exams No     Social History Narrative       ROS:   CONSTITUTIONAL: no fatigue, no unexpected change in weight  SKIN: no worrisome rashes, no worrisome moles, no worrisome lesions  EYES: no acute vision problems or changes.She had Lasik surgery 2 years ago.  Uses reading glasses.   ENT: no ear problems, no mouth problems, no throat problems.  Dental is UTD.   RESP: no significant cough, no shortness of breath  BREAST: no masses, no tenderness, no discharge.   CV: no chest pain, no palpitations, no new or worsening peripheral edema  GI: no nausea, no vomiting, no constipation, no diarrhea  : no frequency, no dysuria, no hematuria.  .     female: .  LMP 2015.  MS:  see HPI.  NEURO: no weakness, no dizziness, no syncope, no headaches.  She has numbness in the left thigh and pelvis since had the low back pain altho the pain has resolved.  ENDOCRINE: no temperature intolerance, no skin/hair changes  HEME: no easy bruising, no bleeding problems  PSYCHIATRIC:She is using her antidepressants as ordered.    Has always had sleep issues.  Uses Diphenhydramine with little benefit/   See PHQ-9: 3  Takes antihistamine at HS.  Fell asleep at 10 pm and wake 2 hours later.   Takes Paroxetine rarely, ie: before going to funerals or sad events.  She takes Adderall.     OBJECTIVE:  /84  Pulse 99  Temp(Src) 97.8  F (36.6  C) (Oral)  Resp 18  Ht 1.727 m (5' 8\")  Wt 105.235 kg (232 lb)  BMI 35.28 kg/m2  SpO2 93%   Wt Readings from Last 1 Encounters:   17 98.9 kg (218 lb)     Constitutional: no distress, comfortable, pleasant   Eyes: anicteric,conjunctiva pink, PERRLA. Glasses.  Ears, Nose and Throat: tympanic membranes clear,  throat clear.   Neck: supple with full range of motion, no thyromegaly.   Cardiovascular: regular rate and rhythm, normal S1 and S2, no murmurs, rubs or gallops, peripheral pulses full and symmetric.   Respiratory: clear to auscultation, no " wheezes or crackles, normal breath sounds   Gastrointestinal: positive bowel sounds, nontender, no hepatosplenomegaly, no masses   Musculoskeletal: full range of motion, no edema.  Bilateral 2nd toe hammertoes.  First toe each foot has bunion on anterior DIP joints.    Skin: no concerning lesions, no jaundice, temp normal   Breast: no lumps, no nipple discharge  Neurological:  normal gait, normal speech, no tremor   Psychological: appropriate mood.  LYMPH: no axillary, cervical,  supraclavicular, infraclavicular or inguinal nodes      Unique was seen today for physical.    Diagnoses and all orders for this visit:    Special screening for malignant neoplasms, colon  -     GI Procedure Referral for colonoscopy.    Encounter for screening mammogram for breast cancer  -     Mammogram, routine screening; Future    ADHD (attention deficit hyperactivity disorder), combined type  -     amphetamine-dextroamphetamine (ADDERALL) 30 MG per tablet; Take 1 tablet (30 mg) by mouth 2 times daily Pt need to see primary for further refill.    History of tetanus, diphtheria, and acellular pertussis booster vaccination (Tdap)  -     TDAP VACCINE (BOOSTRIX)    Elevated cholesterol. Will re-check in future.  -     Lipid panel reflex to direct LDL; Future    Screening for diabetes mellitus  -     Basic metabolic panel; Future    Answers for HPI/ROS submitted by the patient on 9/6/2017   General Symptoms: Yes  Skin Symptoms: No  HENT Symptoms: No  EYE SYMPTOMS: No  HEART SYMPTOMS: No  LUNG SYMPTOMS: No  INTESTINAL SYMPTOMS: No  URINARY SYMPTOMS: No  GYNECOLOGIC SYMPTOMS: No  BREAST SYMPTOMS: No  SKELETAL SYMPTOMS: Yes  BLOOD SYMPTOMS: No  NERVOUS SYSTEM SYMPTOMS: No  MENTAL HEALTH SYMPTOMS: No  Night sweats: Yes  Joint pain: Yes  Bone pain: Yes  Bone fracture: Yes    Total time spent 40 minutes.  More than 50% of the time spent with Ms. Garrison on counseling / coordinating her care

## 2017-09-12 NOTE — PATIENT INSTRUCTIONS
Primary Care Center Phone Number [phone]  Primary Care Center Medication Refill Request Information:  * Please contact your pharmacy regarding ANY request for medication refills.  ** [facility] Prescription Fax = [phone]  * Please allow 3 business days for routine medication refills.  * Please allow 5 business days for controlled substance medication refills.     Primary Care Center Test Result notification information:  *You will be notified with in 7-10 days of your appointment day regarding the results of your test.  If you are on MyChart you will be notified as soon as the provider has reviewed the results and signed off on them.        Please schedule the following appointments:  Mammogram:  Breast Center ([facility]) [phone] ([facility]  Breast Center ([facility]) [phone] ([address])

## 2017-09-12 NOTE — NURSING NOTE
Chief Complaint   Patient presents with     Physical     pt is here for an annual physical       Treva Rockwell CMA at 2:38 PM on 9/12/2017

## 2017-10-11 ENCOUNTER — OFFICE VISIT (OUTPATIENT)
Dept: ORTHOPEDICS | Facility: CLINIC | Age: 52
End: 2017-10-11

## 2017-10-11 VITALS — HEIGHT: 68 IN | WEIGHT: 218 LBS | RESPIRATION RATE: 16 BRPM | BODY MASS INDEX: 33.04 KG/M2

## 2017-10-11 DIAGNOSIS — M84.30XA STRESS REACTION OF BONE: Primary | ICD-10-CM

## 2017-10-11 NOTE — PROGRESS NOTES
Attending Note:   I have personally examined this patient and have reviewed the clinical presentation and progress note with the fellow. I agree with the treatment plan as outlined. The plan was formulated with the fellow on the day of the patient's visit. I have reviewed all imaging with the fellow and agree with the findings in the documentation.     Whit Green MD, CAQ, CCD  [facility]  Sports Medicine and Bone Health

## 2017-10-11 NOTE — LETTER
10/11/2017      RE: Unique Garrison  [id number]  [place] [address]        Subjective:   Unique Garrison is a 52 year old female who is here to follow up on left foot pain.    She states that since her last evaluation she has not consistently worn her boot. She had more significant pain in her contralateral foot due to the phalangeal fracture. Because of this, and the need to move about the home while she was moving, she did not consistently wear protective footwear. Over this time she continued to be barefoot at home but states that she had mild pain when moving about. This is an improvement from prior.    Background:   Duration of symptoms: 3 months  Mechanism of Injury: Insidious Onset; Activity Related running from cross training  Aggravating factors:  unsupportive shoes, toeing off  Relieving Factors: rest  Prior Evaluation: None    PAST MEDICAL, SOCIAL, SURGICAL AND FAMILY HISTORY: She  has a past medical history of ADD (attention deficit disorder); Amblyopia; Anxiety; Depression; Insomnia; Perimenopausal; Tendonitis, bicipital (5/4/2012); and Uterine fibroid.  She  has a past surgical history that includes tonsillectomy; ORAL SURGERY SINGLE TOOTH; gum grafting; [place] screening (1/13/2014); Lasik customvue bilateral (1/17/2014); and Arthroscopy shoulder rotator cuff repair (Right, 2014).  Her family history includes Arthritis in her paternal grandmother; C.A.D. in her father; CANCER in her brother, brother, and mother; Depression in her sister; Hypertension in her father; Lipids in her father; OSTEOPOROSIS in her mother; Prostate Cancer in her father. There is no history of Thyroid Disease, Neurologic Disorder, or Musculoskeletal Disorder.  She reports that she quit smoking about 4 years ago. Her smoking use included Cigarettes. She has a 15.00 pack-year smoking history. She has never used smokeless tobacco. She reports that she drinks alcohol. She reports that she does not use illicit  "drugs.    ALLERGIES: She is allergic to morphine hcl.    CURRENT MEDICATIONS: She has a current medication list which includes the following prescription(s): amphetamine-dextroamphetamine, cholecalciferol, melatonin, paroxetine, progesterone, and tretinoin.     REVIEW OF SYSTEMS:  CONSTITUTIONAL:NEGATIVE for fever, chills, change in weight  INTEGUMENTARY/SKIN: NEGATIVE for worrisome rashes, moles or lesions  MUSCULOSKELETAL:See HPI above  NEURO: NEGATIVE for weakness, dizziness or paresthesias  All other systems reviewed and negative.       Exam:   Resp 16  Ht 5' 7.5\" (1.715 m)  Wt 218 lb (98.9 kg)  BMI 33.64 kg/m2  CONSTITUTIONIAL: healthy, alert and no distress  HEENT: NC/AT, no scleral icterus  SKIN: no suspicious lesions or rashes  GAIT: normal  CARDIO: no LE edema  LUNGS: breathing non labored  ABD: soft, non obese  NEUROLOGIC: No focal neuro deficits  PSYCHIATRIC: affect normal/bright and mentation appears normal.    MUSCULOSKELETAL:   L foot: No edema noted. Nontender over distal metatarsals. Full range of motion and strength.        Assessment/Plan:   1) L 2nd and 4th MT stress reaction, improving: She continues to have ongoing symptoms in her left foot. Discussed the use of protective footwear including tall boot. She worn while she's moving about during her day and while at work. Because of symptoms present at home she likely requires more time for healing. Lasix return to clinic in 1 month for reassessment.    She is also advised that she could return to exercise activities but these would include nonimpact activities in the left lower extremity, including biking, elliptical, and upper extremity weightlifting. We will discuss at follow-up regarding returning to weightbearing activities.    Patient seen and examined with attending physician, Dr. Green.    Leroy Norris MD  Primary Care Sports Medicine Fellow  October 11, 2017      Attending Note:   I have personally examined this patient and have " reviewed the clinical presentation and progress note with the fellow. I agree with the treatment plan as outlined. The plan was formulated with the fellow on the day of the patient's visit. I have reviewed all imaging with the fellow and agree with the findings in the documentation.     Whit Green MD, CAQ, CCD  [facility]  Sports Medicine and Bone Health

## 2017-10-11 NOTE — MR AVS SNAPSHOT
After Visit Summary   10/11/2017    Unique Garrison    MRN: [id number]           Patient Information     Date Of Birth          1965        Visit Information        Provider Department      10/11/2017 3:25 PM Whit Green MD [facility] Medicine         Follow-ups after your visit        Your next 10 appointments already scheduled     Nov 15, 2017  3:20 PM CST   (Arrive by 3:05 PM)   Return Visit with Whit Green MD   [facility] ([facility])    [address] [place] [id number] [phone]              Who to contact     Please call your clinic at [phone] to:    Ask questions about your health    Make or cancel appointments    Discuss your medicines    Learn about your test results    Speak to your doctor   If you have compliments or concerns about an experience at your clinic, or if you wish to file a complaint, please contact [facility] Patient Relations at [phone] or email us at [email or web address]         Additional Information About Your Visit        MyChart Information     [employer] gives you secure access to your electronic health record. If you see a primary care provider, you can also send messages to your care team and make appointments. If you have questions, please call your primary care clinic.  If you do not have a primary care provider, please call [phone] and they will assist you.      [employer] is an electronic gateway that provides easy, online access to your medical records. With [employer], you can request a clinic appointment, read your test results, renew a prescription or communicate with your care team.     To access your existing account, please contact your [facility] Clinic or call [phone] for assistance.        Care EveryWhere ID     This is your Care EveryWhere ID. This could be used by other  "organizations to access your [facility] medical records  [id number]        Your Vitals Were     Respirations Height BMI (Body Mass Index)             16 5' 7.5\" (1.715 m) 33.64 kg/m2          Blood Pressure from Last 3 Encounters:   09/12/17 153/87   05/03/16 167/78   01/08/15 135/84    Weight from Last 3 Encounters:   10/11/17 218 lb (98.9 kg)   07/12/17 218 lb (98.9 kg)   06/22/17 218 lb (98.9 kg)              Today, you had the following     No orders found for display       Primary Care Provider Office Phone # Fax #    Bria Evans, APRN -[phone]       [address] [place] [address] [address] [place] [phone]        Equal Access to Services     KAYDEN BHAKTA : Hadii andrew nanceo Somundo, waaxda luqadaha, qaybta kaalmada adeegyada, gurwinder moffett . So [facility] [phone].    ATENCIÓN: Si habla español, tiene a randolph disposición servicios gratuitos de asistencia lingüística. Charles al [phone].    We comply with applicable federal civil rights laws and [place] laws. We do not discriminate on the basis of race, color, national origin, age, disability, sex, sexual orientation, or gender identity.            Thank you!     Thank you for choosing [facility]  for your care. Our goal is always to provide you with excellent care. Hearing back from our patients is one way we can continue to improve our services. Please take a few minutes to complete the written survey that you may receive in the mail after your visit with us. Thank you!             Your Updated Medication List - Protect others around you: Learn how to safely use, store and throw away your medicines at www.disposemymeds.org.          This list is accurate as of: 10/11/17  3:56 PM.  Always use your most recent med list.                   Brand Name Dispense Instructions for use Diagnosis    amphetamine-dextroamphetamine 30 MG per tablet    ADDERALL    60 tablet    Take 1 tablet (30 mg) by mouth 2 times " daily Pt need to see primary for further refill.    ADHD (attention deficit hyperactivity disorder), combined type       melatonin 1 MG Tabs tablet      Take 1 mg by mouth nightly as needed for sleep        PARoxetine 40 MG tablet    PAXIL    30 tablet    Take 1 tablet (40 mg) by mouth every morning    Adjustment disorder with depressed mood       PROGESTERONE 100MG/G CREAM     30 g    Apply 0.5 g topically 2 times daily.        tretinoin 0.025 % cream    RETIN-A    45 g    Spread a pea size amount into affected area topically at bedtime.  Use sunscreen SPF>20.    Routine general medical examination at a health care facility       VITAMIN D (CHOLECALCIFEROL) PO      Take by mouth daily

## 2017-10-11 NOTE — PROGRESS NOTES
Subjective:   Unique Garrison is a 52 year old female who is here to follow up on left foot pain.    She states that since her last evaluation she has not consistently worn her boot. She had more significant pain in her contralateral foot due to the phalangeal fracture. Because of this, and the need to move about the home while she was moving, she did not consistently wear protective footwear. Over this time she continued to be barefoot at home but states that she had mild pain when moving about. This is an improvement from prior.    Background:   Duration of symptoms: 3 months  Mechanism of Injury: Insidious Onset; Activity Related running from cross training  Aggravating factors:  unsupportive shoes, toeing off  Relieving Factors: rest  Prior Evaluation: None    PAST MEDICAL, SOCIAL, SURGICAL AND FAMILY HISTORY: She  has a past medical history of ADD (attention deficit disorder); Amblyopia; Anxiety; Depression; Insomnia; Perimenopausal; Tendonitis, bicipital (5/4/2012); and Uterine fibroid.  She  has a past surgical history that includes tonsillectomy; ORAL SURGERY SINGLE TOOTH; gum grafting; [place] screening (1/13/2014); Lasik customvue bilateral (1/17/2014); and Arthroscopy shoulder rotator cuff repair (Right, 2014).  Her family history includes Arthritis in her paternal grandmother; C.A.D. in her father; CANCER in her brother, brother, and mother; Depression in her sister; Hypertension in her father; Lipids in her father; OSTEOPOROSIS in her mother; Prostate Cancer in her father. There is no history of Thyroid Disease, Neurologic Disorder, or Musculoskeletal Disorder.  She reports that she quit smoking about 4 years ago. Her smoking use included Cigarettes. She has a 15.00 pack-year smoking history. She has never used smokeless tobacco. She reports that she drinks alcohol. She reports that she does not use illicit drugs.    ALLERGIES: She is allergic to morphine hcl.    CURRENT MEDICATIONS: She has a current  "medication list which includes the following prescription(s): amphetamine-dextroamphetamine, cholecalciferol, melatonin, paroxetine, progesterone, and tretinoin.     REVIEW OF SYSTEMS:  CONSTITUTIONAL:NEGATIVE for fever, chills, change in weight  INTEGUMENTARY/SKIN: NEGATIVE for worrisome rashes, moles or lesions  MUSCULOSKELETAL:See HPI above  NEURO: NEGATIVE for weakness, dizziness or paresthesias  All other systems reviewed and negative.       Exam:   Resp 16  Ht 5' 7.5\" (1.715 m)  Wt 218 lb (98.9 kg)  BMI 33.64 kg/m2  CONSTITUTIONIAL: healthy, alert and no distress  HEENT: NC/AT, no scleral icterus  SKIN: no suspicious lesions or rashes  GAIT: normal  CARDIO: no LE edema  LUNGS: breathing non labored  ABD: soft, non obese  NEUROLOGIC: No focal neuro deficits  PSYCHIATRIC: affect normal/bright and mentation appears normal.    MUSCULOSKELETAL:   L foot: No edema noted. Nontender over distal metatarsals. Full range of motion and strength.        Assessment/Plan:   1) L 2nd and 4th MT stress reaction, improving: She continues to have ongoing symptoms in her left foot. Discussed the use of protective footwear including tall boot. She worn while she's moving about during her day and while at work. Because of symptoms present at home she likely requires more time for healing. Lasix return to clinic in 1 month for reassessment.    She is also advised that she could return to exercise activities but these would include nonimpact activities in the left lower extremity, including biking, elliptical, and upper extremity weightlifting. We will discuss at follow-up regarding returning to weightbearing activities.    Patient seen and examined with attending physician, Dr. Green.    Leroy Norris MD  Primary Care Sports Medicine Fellow  October 11, 2017    "

## 2017-11-15 ENCOUNTER — OFFICE VISIT (OUTPATIENT)
Dept: ORTHOPEDICS | Facility: CLINIC | Age: 52
End: 2017-11-15

## 2017-11-15 DIAGNOSIS — M84.30XA STRESS REACTION OF BONE: Primary | ICD-10-CM

## 2017-11-15 NOTE — MR AVS SNAPSHOT
After Visit Summary   11/15/2017    Unique Garrison    MRN: [id number]           Patient Information     Date Of Birth          1965        Visit Information        Provider Department      11/15/2017 3:20 PM Whit Green MD [facility] Sports Medicine        Today's Diagnoses     Stress reaction of bone    -  1       Follow-ups after your visit        Who to contact     Please call your clinic at [phone] to:    Ask questions about your health    Make or cancel appointments    Discuss your medicines    Learn about your test results    Speak to your doctor   If you have compliments or concerns about an experience at your clinic, or if you wish to file a complaint, please contact [facility] Patient Relations at [phone] or email us at [email or web address]         Additional Information About Your Visit        MyChart Information     [employer] gives you secure access to your electronic health record. If you see a primary care provider, you can also send messages to your care team and make appointments. If you have questions, please call your primary care clinic.  If you do not have a primary care provider, please call [phone] and they will assist you.      [employer] is an electronic gateway that provides easy, online access to your medical records. With [employer], you can request a clinic appointment, read your test results, renew a prescription or communicate with your care team.     To access your existing account, please contact your [facility] or call [phone] for assistance.        Care EveryWhere ID     This is your Care EveryWhere ID. This could be used by other organizations to access your [facility] medical records  [id number]         Blood Pressure from Last 3 Encounters:   09/12/17 153/87   05/03/16 167/78   01/08/15 135/84    Weight from Last 3 Encounters:   10/11/17 218 lb (98.9 kg)   07/12/17  218 lb (98.9 kg)   06/22/17 218 lb (98.9 kg)              Today, you had the following     No orders found for display       Primary Care Provider Office Phone # Fax #    RICKY Ferrera [phone]       [address] [place] [address] 741  [facility] [address]        Equal Access to Services     KAYDEN BHAKTA : Hadii aad ku hadasho Soomaali, waaxda luqadaha, qaybta kaalmada adeegyada, waxay idiin hayaan adeeg kharash laavinash . So [facility] [phone].    ATENCIÓN: Si habla español, tiene a randolph disposición servicios gratuitos de asistencia lingüística. Markame al [phone].    We comply with applicable federal civil rights laws and [place] laws. We do not discriminate on the basis of race, color, national origin, age, disability, sex, sexual orientation, or gender identity.            Thank you!     Thank you for choosing [facility]  for your care. Our goal is always to provide you with excellent care. Hearing back from our patients is one way we can continue to improve our services. Please take a few minutes to complete the written survey that you may receive in the mail after your visit with us. Thank you!             Your Updated Medication List - Protect others around you: Learn how to safely use, store and throw away your medicines at www.disposemymeds.org.          This list is accurate as of: 11/15/17  4:06 PM.  Always use your most recent med list.                   Brand Name Dispense Instructions for use Diagnosis    amphetamine-dextroamphetamine 30 MG per tablet    ADDERALL    60 tablet    Take 1 tablet (30 mg) by mouth 2 times daily Pt need to see primary for further refill.    ADHD (attention deficit hyperactivity disorder), combined type       melatonin 1 MG Tabs tablet      Take 1 mg by mouth nightly as needed for sleep        PARoxetine 40 MG tablet    PAXIL    30 tablet    Take 1 tablet (40 mg) by mouth every morning    Adjustment disorder with depressed mood        PROGESTERONE 100MG/G CREAM     30 g    Apply 0.5 g topically 2 times daily.        tretinoin 0.025 % cream    RETIN-A    45 g    Spread a pea size amount into affected area topically at bedtime.  Use sunscreen SPF>20.    Routine general medical examination at a health care facility       VITAMIN D (CHOLECALCIFEROL) PO      Take by mouth daily

## 2017-11-15 NOTE — PROGRESS NOTES
Subjective:   Unique Garrison is a 52 year old female who is here to follow up on left foot pain.    Doing much better. She worn the boot for distance wallking and work for the last month.  Stopped wearing the walking boot last Friday due it not holding the air well.  No pain with ambulation in her tennis shoes.  No pain walking at her house barefoot.  Happy with her progress.      Background:   Duration of symptoms: 3 months  Mechanism of Injury: Insidious Onset; Activity Related running from cross training  Aggravating factors:  unsupportive shoes, toeing off  Relieving Factors: rest  Prior Evaluation: None      ALLERGIES: She is allergic to morphine hcl.    Current Outpatient Prescriptions   Medication     amphetamine-dextroamphetamine (ADDERALL) 30 MG per tablet     VITAMIN D, CHOLECALCIFEROL, PO     melatonin 1 MG TABS tablet     PARoxetine (PAXIL) 40 MG tablet     PROGESTERONE 100MG/G CREAM     tretinoin (RETIN-A) 0.025 % cream     No current facility-administered medications for this visit.           Exam:   There were no vitals taken for this visit.  CONSTITUTIONIAL: healthy, alert and no distress    NEUROLOGIC: No focal neuro deficits  PSYCHIATRIC: affect normal/bright and mentation appears normal.    MUSCULOSKELETAL:   L foot: No edema noted. Mild ttp over the 2nd and 4th mid-distal metatarsals. Full range of motion and strength.        Assessment/Plan:   1) L 2nd and 4th MT stress reaction, improving:     Doing much better now.  D/c walking boot.  No impact exercise until the New Year but may resume walking for fitness and small lunges.  RTC prn.        Whit Green MD, CAQ, FACSM, CCD  [facility]  Sports Medicine and Bone Health  Team Physician;  Athletics

## 2017-11-15 NOTE — LETTER
11/15/2017      RE: Unique Garrison  [address]  [address]  [facility] [address]        Subjective:   Unique Garrison is a 52 year old female who is here to follow up on left foot pain.    Doing much better. She worn the boot for distance wallking and work for the last month.  Stopped wearing the walking boot last Friday due it not holding the air well.  No pain with ambulation in her tennis shoes.  No pain walking at her house barefoot.  Happy with her progress.      Background:   Duration of symptoms: 3 months  Mechanism of Injury: Insidious Onset; Activity Related running from cross training  Aggravating factors:  unsupportive shoes, toeing off  Relieving Factors: rest  Prior Evaluation: None      ALLERGIES: She is allergic to morphine hcl.    Current Outpatient Prescriptions   Medication     amphetamine-dextroamphetamine (ADDERALL) 30 MG per tablet     VITAMIN D, CHOLECALCIFEROL, PO     melatonin 1 MG TABS tablet     PARoxetine (PAXIL) 40 MG tablet     PROGESTERONE 100MG/G CREAM     tretinoin (RETIN-A) 0.025 % cream     No current facility-administered medications for this visit.           Exam:   There were no vitals taken for this visit.  CONSTITUTIONIAL: healthy, alert and no distress    NEUROLOGIC: No focal neuro deficits  PSYCHIATRIC: affect normal/bright and mentation appears normal.    MUSCULOSKELETAL:   L foot: No edema noted. Mild ttp over the 2nd and 4th mid-distal metatarsals. Full range of motion and strength.        Assessment/Plan:   1) L 2nd and 4th MT stress reaction, improving:     Doing much better now.  D/c walking boot.  No impact exercise until the New Year but may resume walking for fitness and small lunges.  RTC prn.        Whit Green MD, CAQ, FACSM, CCD  [facility]  Sports Medicine and Bone Health  Team Physician;  Athletics

## 2018-01-03 ENCOUNTER — MYC REFILL (OUTPATIENT)
Dept: INTERNAL MEDICINE | Facility: CLINIC | Age: 53
End: 2018-01-03

## 2018-01-03 DIAGNOSIS — F90.2 ADHD (ATTENTION DEFICIT HYPERACTIVITY DISORDER), COMBINED TYPE: ICD-10-CM

## 2018-01-09 RX ORDER — DEXTROAMPHETAMINE SACCHARATE, AMPHETAMINE ASPARTATE, DEXTROAMPHETAMINE SULFATE AND AMPHETAMINE SULFATE 7.5; 7.5; 7.5; 7.5 MG/1; MG/1; MG/1; MG/1
30 TABLET ORAL 2 TIMES DAILY
Qty: 60 TABLET | Refills: 0 | Status: SHIPPED | OUTPATIENT
Start: 2018-01-09 | End: 2018-02-28

## 2018-01-09 NOTE — TELEPHONE ENCOUNTER
Message from Dylan:  Sophie Nelson RN Wed Shadi 3, 2018 12:46 PM        ----- Message -----   From: Unique Garrison   Sent: 1/3/2018 12:37 PM   To: [facility] Nursing Staff-  Subject: Medication Renewal Request     Original authorizing provider: RICKY Wasserman CNP would like a refill of the following medications:  amphetamine-dextroamphetamine (ADDERALL) 30 MG per tablet [RICKY Wasserman CNP]    Preferred pharmacy: [facility] [place], [place] - [address] [place] [address] [phone]    Comment:

## 2018-02-28 ENCOUNTER — MYC REFILL (OUTPATIENT)
Dept: INTERNAL MEDICINE | Facility: CLINIC | Age: 53
End: 2018-02-28

## 2018-02-28 DIAGNOSIS — F90.2 ADHD (ATTENTION DEFICIT HYPERACTIVITY DISORDER), COMBINED TYPE: ICD-10-CM

## 2018-02-28 RX ORDER — DEXTROAMPHETAMINE SACCHARATE, AMPHETAMINE ASPARTATE, DEXTROAMPHETAMINE SULFATE AND AMPHETAMINE SULFATE 7.5; 7.5; 7.5; 7.5 MG/1; MG/1; MG/1; MG/1
30 TABLET ORAL 2 TIMES DAILY
Qty: 60 TABLET | Refills: 0 | Status: SHIPPED | OUTPATIENT
Start: 2018-02-28 | End: 2018-04-24

## 2018-02-28 NOTE — TELEPHONE ENCOUNTER
Message from [place]:  Sophie Nelson RN Wed Feb 28, 2018 2:49 PM        ----- Message -----  From: Unique Garrison  Sent: 2/28/2018 10:01 AM  To: [facility] Nursing Staff-  Subject: Medication Renewal Request     Original authorizing provider: RICKY Wasserman CNP would like a refill of the following medications:  amphetamine-dextroamphetamine (ADDERALL) 30 MG per tablet [RICKY Wasserman CNP]    Preferred pharmacy: [facility] [address] [phone]    Comment:  ---------------  Rx taken to the [facility] pharmacy.  Hilaria Regalado RN

## 2018-04-24 ENCOUNTER — MYC REFILL (OUTPATIENT)
Dept: INTERNAL MEDICINE | Facility: CLINIC | Age: 53
End: 2018-04-24

## 2018-04-24 DIAGNOSIS — F90.2 ADHD (ATTENTION DEFICIT HYPERACTIVITY DISORDER), COMBINED TYPE: ICD-10-CM

## 2018-04-24 DIAGNOSIS — F43.21 ADJUSTMENT DISORDER WITH DEPRESSED MOOD: ICD-10-CM

## 2018-04-24 RX ORDER — DEXTROAMPHETAMINE SACCHARATE, AMPHETAMINE ASPARTATE, DEXTROAMPHETAMINE SULFATE AND AMPHETAMINE SULFATE 7.5; 7.5; 7.5; 7.5 MG/1; MG/1; MG/1; MG/1
30 TABLET ORAL 2 TIMES DAILY
Qty: 60 TABLET | Refills: 0 | Status: SHIPPED | OUTPATIENT
Start: 2018-04-24 | End: 2018-04-27

## 2018-04-24 RX ORDER — PAROXETINE 40 MG/1
40 TABLET, FILM COATED ORAL EVERY MORNING
Qty: 30 TABLET | Refills: 3 | Status: SHIPPED | OUTPATIENT
Start: 2018-04-24 | End: 2020-09-03

## 2018-04-24 NOTE — TELEPHONE ENCOUNTER
Message from Dylan:  Sophie Nelson, RN Tue Apr 24, 2018 2:22 PM        ----- Message -----   From: Unique Garrison   Sent: 4/24/2018 12:48 PM   To: [facility] Nursing Staff-  Subject: Medication Renewal Request     Original authorizing provider: RICKY Wasserman CNP would like a refill of the following medications:  PARoxetine (PAXIL) 40 MG tablet [RICKY Wasserman CNP]  amphetamine-dextroamphetamine (ADDERALL) 30 MG per tablet [RICKY Wasserman CNP]    Preferred pharmacy: [place], [place] - [address] [place] [address] [phone]    Comment:

## 2018-04-27 ENCOUNTER — MYC MEDICAL ADVICE (OUTPATIENT)
Dept: INTERNAL MEDICINE | Facility: CLINIC | Age: 53
End: 2018-04-27

## 2018-04-27 DIAGNOSIS — F90.2 ADHD (ATTENTION DEFICIT HYPERACTIVITY DISORDER), COMBINED TYPE: ICD-10-CM

## 2018-04-27 RX ORDER — DEXTROAMPHETAMINE SACCHARATE, AMPHETAMINE ASPARTATE, DEXTROAMPHETAMINE SULFATE AND AMPHETAMINE SULFATE 7.5; 7.5; 7.5; 7.5 MG/1; MG/1; MG/1; MG/1
30 TABLET ORAL 2 TIMES DAILY
Qty: 60 TABLET | Refills: 0 | Status: SHIPPED | OUTPATIENT
Start: 2018-04-27 | End: 2018-06-11

## 2018-06-11 ENCOUNTER — MYC REFILL (OUTPATIENT)
Dept: FAMILY MEDICINE | Facility: CLINIC | Age: 53
End: 2018-06-11

## 2018-06-11 DIAGNOSIS — F90.2 ADHD (ATTENTION DEFICIT HYPERACTIVITY DISORDER), COMBINED TYPE: ICD-10-CM

## 2018-06-12 RX ORDER — DEXTROAMPHETAMINE SACCHARATE, AMPHETAMINE ASPARTATE, DEXTROAMPHETAMINE SULFATE AND AMPHETAMINE SULFATE 7.5; 7.5; 7.5; 7.5 MG/1; MG/1; MG/1; MG/1
30 TABLET ORAL 2 TIMES DAILY
Qty: 60 TABLET | Refills: 0 | Status: SHIPPED | OUTPATIENT
Start: 2018-06-12 | End: 2018-08-07

## 2018-06-12 NOTE — TELEPHONE ENCOUNTER
Message from MyChart:  Original authorizing provider: Sudhakar Cee MD    Unique Garrison would like a refill of the following medications:  amphetamine-dextroamphetamine (ADDERALL) 30 MG per tablet [Sudhakar Cee MD]    Preferred pharmacy: [address] [phone]    Comment:

## 2018-08-02 ENCOUNTER — OFFICE VISIT (OUTPATIENT)
Dept: URGENT CARE | Facility: URGENT CARE | Age: 53
End: 2018-08-02
Payer: COMMERCIAL

## 2018-08-02 VITALS
HEART RATE: 105 BPM | TEMPERATURE: 98.8 F | SYSTOLIC BLOOD PRESSURE: 145 MMHG | WEIGHT: 242.31 LBS | DIASTOLIC BLOOD PRESSURE: 84 MMHG | BODY MASS INDEX: 37.39 KG/M2

## 2018-08-02 DIAGNOSIS — R19.7 DIARRHEA, UNSPECIFIED TYPE: Primary | ICD-10-CM

## 2018-08-02 PROCEDURE — 99213 OFFICE O/P EST LOW 20 MIN: CPT | Performed by: PHYSICIAN ASSISTANT

## 2018-08-02 NOTE — PATIENT INSTRUCTIONS
(R19.7) Diarrhea, unspecified type  (primary encounter diagnosis)  Comment:   Plan: bland diet.  See handout.  Follow up should symptoms persist or worsen.      Diarrhea with Uncertain Cause (Adult)    Diarrhea is when stools are loose and watery. This can be caused by:    Viral infections    Bacterial infections    Food poisoning    Parasites    Irritable bowel syndrome (IBS)    Inflammatory bowel diseases such as ulcerative colitis, Crohn's disease, and celiac disease    Food intolerance, such as to lactose, the sugar found in milk and milk products    Reaction to medicines like antibiotics, laxatives, cancer drugs, and antacids  Along with diarrhea, you may also have:    Abdominal pain and cramping    Nausea and vomiting    Loss of bowel control    Fever and chills    Bloody stools  In some cases, antibiotics may help to treat diarrhea. You may have a stool sample test. This is done to see what is causing your diarrhea, and if antibiotics will help treat it. The results of a stool sample test may take up to 2 days. The healthcare provider may not give you antibiotics until he or she has the stool test results.  Diarrhea can cause dehydration. This is the loss of too much water and other fluids from the body. When this occurs, body fluid must be replaced. This can be done with oral rehydration solutions. Oral rehydration solutions are available at drugstores and grocery stores without a prescription.  Home care  Follow all instructions given by your healthcare provider. Rest at home for the next 24 hours, or until you feel better. Avoid caffeine, tobacco, and alcohol. These can make diarrhea, cramping, and pain worse.  If taking medicines:    Don t take over-the-counter diarrhea or nausea medicines unless your healthcare provider tells you to.    You may use acetaminophen or NSAID medicines like ibuprofen or naproxen to reduce pain and fever. Don t use these if you have chronic liver or kidney disease, or ever had  a stomach ulcer or gastrointestinal bleeding. Don't use NSAID medicines if you are already taking one for another condition (like arthritis) or are on daily aspirin therapy (such as for heart disease or after a stroke). Talk with your healthcare provider first.    If antibiotics were prescribed, be sure you take them until they are finished. Don t stop taking them even when you feel better. Antibiotics must be taken as a full course.  To prevent the spread of illness:    Remember that washing with soap and water and using alcohol-based  is the best way to prevent the spread of infection.    Clean the toilet after each use.    Wash your hands before eating.    Wash your hands before and after preparing food. Keep in mind that people with diarrhea or vomiting should not prepare food for others.    Wash your hands after using cutting boards, countertops, and knives that have been in contact with raw foods.    Wash and then peel fruits and vegetables.    Keep uncooked meats away from cooked and ready-to-eat foods.    Use a food thermometer when cooking. Cook poultry to at least 165 F (74 C). Cook ground meat (beef, veal, pork, lamb) to at least 160 F (71 C). Cook fresh beef, veal, lamb, and pork to at least 145 F (63 C).    Don t eat raw or undercooked eggs (poached or zoila side up), poultry, meat, or unpasteurized milk and juices.  Food and drinks  The main goal while treating vomiting or diarrhea is to prevent dehydration. This is done by taking small amounts of liquids often.    Keep in mind that liquids are more important than food right now.    Drink only small amounts of liquids at a time.    Don t force yourself to eat, especially if you are having cramping, vomiting, or diarrhea. Don t eat large amounts at a time, even if you are hungry.    If you eat, avoid fatty, greasy, spicy, or fried foods.    Don t eat dairy foods or drink milk if you have diarrhea. These can make diarrhea worse.  During the  first 24 hours you can try:    Oral rehydration solutions. Do not use sports drinks. They have too much sugar and not enough electrolytes.    Soft drinks without caffeine    Ginger ale    Water (plain or flavored)    Decaf tea or coffee    Clear broth, consommé, or bouillon    Gelatin, popsicles, or frozen fruit juice bars  The second 24 hours, if you are feeling better, you can add:    Hot cereal, plain toast, bread, rolls, or crackers    Plain noodles, rice, mashed potatoes, chicken noodle soup, or rice soup    Unsweetened canned fruit (no pineapple)    Bananas  As you recover:    Limit fat intake to less than 15 grams per day. Don t eat margarine, butter, oils, mayonnaise, sauces, gravies, fried foods, peanut butter, meat, poultry, or fish.    Limit fiber. Don t eat raw or cooked vegetables, fresh fruits except bananas, or bran cereals.    Limit caffeine and chocolate.    Limit dairy.    Don t use spices or seasonings except salt.    Go back to your normal diet over time, as you feel better and your symptoms improve.    If the symptoms come back, go back to a simple diet or clear liquids.  Follow-up care  Follow up with your healthcare provider, or as advised. If a stool sample was taken or cultures were done, call the healthcare provider for the results as instructed.  Call 911  Call 911 if you have any of these symptoms:    Trouble breathing    Confusion    Extreme drowsiness or trouble walking    Loss of consciousness    Rapid heart rate    Chest pain    Stiff neck    Seizure  When to seek medical advice  Call your healthcare provider right away if any of these occur:    Abdominal pain that gets worse    Constant lower right abdominal pain    Continued vomiting and inability to keep liquids down    Diarrhea more than 5 times a day    Blood in vomit or stool    Dark urine or no urine for 8 hours, dry mouth and tongue, tiredness, weakness, or dizziness    Drowsiness    New rash    You don t get better in 2 to 3  days    Fever of 100.4 F (38 C) or higher, or as directed by your healthcare provider  Date Last Reviewed: 1/3/2016    [id number] [employer]. [address] [place] [address], [place] [address]. All rights reserved. This information is not intended as a substitute for professional medical care. Always follow your healthcare professional's instructions.        Diet for Vomiting or Diarrhea (Adult)    Your symptoms may return or get worse after eating certain foods listed below. If this happens, stop eating these foods until your symptoms ease and you feel better.  Once the vomiting stops, follow the steps below.   During the first 12 to 24 hours  During the first 12 to 24 hours, follow this diet:    Drinks. Plain water, sport drinks like electrolyte solutions, soft drinks without caffeine, mineral water (plain or flavored), clear fruit juices, and decaffeinated tea and coffee.    Soups. Clear broth.    Desserts. Plain gelatin, popsicles, and fruit juice bars. As you feel better, you may add 6 to 8 ounces of yogurt per day. If you have diarrhea, don't have foods or drinks that contain sugar, high-fructose corn syrup, or sugar alcohols.  During the next 24 hours  During the next 24 hours you may add the following to the above:    Hot cereal, plain toast, bread, rolls, and crackers    Plain noodles, rice, mashed potatoes, and chicken noodle or rice soup    Unsweetened canned fruit (but not pineapple) and bananas  Don't eat more than 15 grams of fat a day. Do this by staying away from margarine, butter, oils, mayonnaise, sauces, gravies, fried foods, peanut butter, meat, poultry, and fish.  Don't eat much fiber. Stay away from raw or cooked vegetables, fresh fruits (except bananas), and bran cereals.  Limit how much caffeine and chocolate you have. Do not use any spices or seasonings except salt.  During the next 24 hours  Slowly go back to your normal diet, as you feel better and your symptoms ease.  Date Last  Reviewed: 8/1/2016 2000-2017 [employer]. [address] [place] [address], [place] [address]. All rights reserved. This information is not intended as a substitute for professional medical care. Always follow your healthcare professional's instructions.

## 2018-08-02 NOTE — PROGRESS NOTES
SUBJECTIVE:   Unique is a 53 year old female  who is here for diarrhea.   The patient reports the she has had diarrhea symptoms for approximately 5 day(s).  The patient reports the following symptoms:abdominal cramping relieved with defication. Denies any blood in her stools.  Denies any vomiting.      Denies any recent antibiotic therapy.  Denies any recent international travel or camping.      Associated symptoms include: none.  The diarrhea is made worse by fatty foods.  The diarrhea is made better with: NEGATIVE   No one else in the family has symptoms NO  Has patient ate outside the home or ate left overs NO  Has patient been on antibiotics NO    Patient is under increased stress with upcoming job change.  Last day of her current job is tomorrow.  Feels that some of her symptoms may be related to stress.        Current Outpatient Prescriptions:      amphetamine-dextroamphetamine (ADDERALL) 30 MG per tablet, Take 1 tablet (30 mg) by mouth 2 times daily, Disp: 60 tablet, Rfl: 0     PARoxetine (PAXIL) 40 MG tablet, Take 1 tablet (40 mg) by mouth every morning, Disp: 30 tablet, Rfl: 3     PROGESTERONE 100MG/G CREAM, Apply 0.5 g topically 2 times daily., Disp: 30 g, Rfl: 6     melatonin 1 MG TABS tablet, Take 1 mg by mouth nightly as needed for sleep, Disp: , Rfl:      tretinoin (RETIN-A) 0.025 % cream, Spread a pea size amount into affected area topically at bedtime.  Use sunscreen SPF>20. (Patient not taking: Reported on 9/12/2017), Disp: 45 g, Rfl: 11     VITAMIN D, CHOLECALCIFEROL, PO, Take by mouth daily, Disp: , Rfl:     Allergies   Allergen Reactions     Morphine Hcl Itching       Past Medical History:   Diagnosis Date     ADD (attention deficit disorder)      Amblyopia     left eye     Anxiety      Depression     Resolved     Insomnia      Perimenopausal      Tendonitis, bicipital 5/4/2012    left     Uterine fibroid          REVIEW OF SYSTEMS  CONSTITUTIONAL:NEGATIVE  EYES: NEGATIVE  ENT/MOUTH:  NEGATIVE  RESP: NEGATIVE  CV: NEGATIVE  GI: as per HPI  : NEGATIVE  MUSCULOSKELATAL: NEGATIVE  INTEGUMENTARY/SKIN: NEGATIVE    OBJECTIVE:    /84  Pulse 105  Temp 98.8  F (37.1  C) (Oral)  Wt 242 lb 5 oz (109.9 kg)  BMI 37.39 kg/m2  GENERAL: healthy, alert, no distress    SKIN: Skin color, texture, turgor normal. No rashes or lesions.    OP: oropharynx clear; moist  NECK: supple    HEART: regular rate and rhythm and no murmurs, clicks, or gallops    LUNGS: clear to auscultation    ABDOMEN:  positive bowel sounds, soft, nontender    R19.7) Diarrhea, unspecified type  (primary encounter diagnosis)  Comment:   Plan: bland diet.  See handout.  Follow up should symptoms persist or worsen.    Patient expresses understanding and agreement with the assessment and plan as above.

## 2018-08-02 NOTE — MR AVS SNAPSHOT
After Visit Summary   8/2/2018    Unique Garrison    MRN: [id number]           Patient Information     Date Of Birth          1965        Visit Information        Provider Department      8/2/2018 5:10 PM Suyapa Castellano PA-C [facility]        Today's Diagnoses     Diarrhea, unspecified type    -  1      Care Instructions    (R19.7) Diarrhea, unspecified type  (primary encounter diagnosis)  Comment:   Plan: bland diet.  See handout.  Follow up should symptoms persist or worsen.      Diarrhea with Uncertain Cause (Adult)    Diarrhea is when stools are loose and watery. This can be caused by:    Viral infections    Bacterial infections    Food poisoning    Parasites    Irritable bowel syndrome (IBS)    Inflammatory bowel diseases such as ulcerative colitis, Crohn's disease, and celiac disease    Food intolerance, such as to lactose, the sugar found in milk and milk products    Reaction to medicines like antibiotics, laxatives, cancer drugs, and antacids  Along with diarrhea, you may also have:    Abdominal pain and cramping    Nausea and vomiting    Loss of bowel control    Fever and chills    Bloody stools  In some cases, antibiotics may help to treat diarrhea. You may have a stool sample test. This is done to see what is causing your diarrhea, and if antibiotics will help treat it. The results of a stool sample test may take up to 2 days. The healthcare provider may not give you antibiotics until he or she has the stool test results.  Diarrhea can cause dehydration. This is the loss of too much water and other fluids from the body. When this occurs, body fluid must be replaced. This can be done with oral rehydration solutions. Oral rehydration solutions are available at drugstores and grocery stores without a prescription.  Home care  Follow all instructions given by your healthcare provider. Rest at home for the next 24 hours, or until you feel better. Avoid  caffeine, tobacco, and alcohol. These can make diarrhea, cramping, and pain worse.  If taking medicines:    Don t take over-the-counter diarrhea or nausea medicines unless your healthcare provider tells you to.    You may use acetaminophen or NSAID medicines like ibuprofen or naproxen to reduce pain and fever. Don t use these if you have chronic liver or kidney disease, or ever had a stomach ulcer or gastrointestinal bleeding. Don't use NSAID medicines if you are already taking one for another condition (like arthritis) or are on daily aspirin therapy (such as for heart disease or after a stroke). Talk with your healthcare provider first.    If antibiotics were prescribed, be sure you take them until they are finished. Don t stop taking them even when you feel better. Antibiotics must be taken as a full course.  To prevent the spread of illness:    Remember that washing with soap and water and using alcohol-based  is the best way to prevent the spread of infection.    Clean the toilet after each use.    Wash your hands before eating.    Wash your hands before and after preparing food. Keep in mind that people with diarrhea or vomiting should not prepare food for others.    Wash your hands after using cutting boards, countertops, and knives that have been in contact with raw foods.    Wash and then peel fruits and vegetables.    Keep uncooked meats away from cooked and ready-to-eat foods.    Use a food thermometer when cooking. Cook poultry to at least 165 F (74 C). Cook ground meat (beef, veal, pork, lamb) to at least 160 F (71 C). Cook fresh beef, veal, lamb, and pork to at least 145 F (63 C).    Don t eat raw or undercooked eggs (poached or zoila side up), poultry, meat, or unpasteurized milk and juices.  Food and drinks  The main goal while treating vomiting or diarrhea is to prevent dehydration. This is done by taking small amounts of liquids often.    Keep in mind that liquids are more important than  food right now.    Drink only small amounts of liquids at a time.    Don t force yourself to eat, especially if you are having cramping, vomiting, or diarrhea. Don t eat large amounts at a time, even if you are hungry.    If you eat, avoid fatty, greasy, spicy, or fried foods.    Don t eat dairy foods or drink milk if you have diarrhea. These can make diarrhea worse.  During the first 24 hours you can try:    Oral rehydration solutions. Do not use sports drinks. They have too much sugar and not enough electrolytes.    Soft drinks without caffeine    Ginger ale    Water (plain or flavored)    Decaf tea or coffee    Clear broth, consommé, or bouillon    Gelatin, popsicles, or frozen fruit juice bars  The second 24 hours, if you are feeling better, you can add:    Hot cereal, plain toast, bread, rolls, or crackers    Plain noodles, rice, mashed potatoes, chicken noodle soup, or rice soup    Unsweetened canned fruit (no pineapple)    Bananas  As you recover:    Limit fat intake to less than 15 grams per day. Don t eat margarine, butter, oils, mayonnaise, sauces, gravies, fried foods, peanut butter, meat, poultry, or fish.    Limit fiber. Don t eat raw or cooked vegetables, fresh fruits except bananas, or bran cereals.    Limit caffeine and chocolate.    Limit dairy.    Don t use spices or seasonings except salt.    Go back to your normal diet over time, as you feel better and your symptoms improve.    If the symptoms come back, go back to a simple diet or clear liquids.  Follow-up care  Follow up with your healthcare provider, or as advised. If a stool sample was taken or cultures were done, call the healthcare provider for the results as instructed.  Call 911  Call 911 if you have any of these symptoms:    Trouble breathing    Confusion    Extreme drowsiness or trouble walking    Loss of consciousness    Rapid heart rate    Chest pain    Stiff neck    Seizure  When to seek medical advice  Call your healthcare provider  right away if any of these occur:    Abdominal pain that gets worse    Constant lower right abdominal pain    Continued vomiting and inability to keep liquids down    Diarrhea more than 5 times a day    Blood in vomit or stool    Dark urine or no urine for 8 hours, dry mouth and tongue, tiredness, weakness, or dizziness    Drowsiness    New rash    You don t get better in 2 to 3 days    Fever of 100.4 F (38 C) or higher, or as directed by your healthcare provider  Date Last Reviewed: 1/3/2016    [id number] [employer]. [address] [place] [address]. All rights reserved. This information is not intended as a substitute for professional medical care. Always follow your healthcare professional's instructions.        Diet for Vomiting or Diarrhea (Adult)    Your symptoms may return or get worse after eating certain foods listed below. If this happens, stop eating these foods until your symptoms ease and you feel better.  Once the vomiting stops, follow the steps below.   During the first 12 to 24 hours  During the first 12 to 24 hours, follow this diet:    Drinks. Plain water, sport drinks like electrolyte solutions, soft drinks without caffeine, mineral water (plain or flavored), clear fruit juices, and decaffeinated tea and coffee.    Soups. Clear broth.    Desserts. Plain gelatin, popsicles, and fruit juice bars. As you feel better, you may add 6 to 8 ounces of yogurt per day. If you have diarrhea, don't have foods or drinks that contain sugar, high-fructose corn syrup, or sugar alcohols.  During the next 24 hours  During the next 24 hours you may add the following to the above:    Hot cereal, plain toast, bread, rolls, and crackers    Plain noodles, rice, mashed potatoes, and chicken noodle or rice soup    Unsweetened canned fruit (but not pineapple) and bananas  Don't eat more than 15 grams of fat a day. Do this by staying away from margarine, butter, oils, mayonnaise, sauces, gravies,  fried foods, peanut butter, meat, poultry, and fish.  Don't eat much fiber. Stay away from raw or cooked vegetables, fresh fruits (except bananas), and bran cereals.  Limit how much caffeine and chocolate you have. Do not use any spices or seasonings except salt.  During the next 24 hours  Slowly go back to your normal diet, as you feel better and your symptoms ease.  Date Last Reviewed: 8/1/2016 2000-2017 [employer]. [address] [place] [address]. All rights reserved. This information is not intended as a substitute for professional medical care. Always follow your healthcare professional's instructions.                Follow-ups after your visit        Who to contact     If you have questions or need follow up information about today's clinic visit or your schedule please contact [facility] directly at [phone].  Normal or non-critical lab and imaging results will be communicated to you by [employer], letter or phone within 4 business days after the clinic has received the results. If you do not hear from us within 7 days, please contact the clinic through [employer] or phone. If you have a critical or abnormal lab result, we will notify you by phone as soon as possible.  Submit refill requests through [employer] or call your pharmacy and they will forward the refill request to us. Please allow 3 business days for your refill to be completed.          Additional Information About Your Visit        [employer] Information     [employer] gives you secure access to your electronic health record. If you see a primary care provider, you can also send messages to your care team and make appointments. If you have questions, please call your primary care clinic.  If you do not have a primary care provider, please call [phone] and they will assist you.        Care EveryWhere ID     This is your Care EveryWhere ID. This could be used by other organizations to access your  [facility] medical records  [id number]        Your Vitals Were     Pulse Temperature BMI (Body Mass Index)             105 98.8  F (37.1  C) (Oral) 37.39 kg/m2          Blood Pressure from Last 3 Encounters:   08/02/18 145/84   09/12/17 153/87   05/03/16 167/78    Weight from Last 3 Encounters:   08/02/18 242 lb 5 oz (109.9 kg)   10/11/17 218 lb (98.9 kg)   07/12/17 218 lb (98.9 kg)              Today, you had the following     No orders found for display       Primary Care Provider Office Phone # Fax #    Bria Evans, APRN [phone]       [address] [place] [address]        Equal Access to Services     KAYDEN BHAKTA : Hadii aad ku hadasho Soomaali, waaxda luqadaha, qaybta kaalmada adeegyada, gurwinder moffett . So [facility] [phone].    ATENCIÓN: Si habla español, tiene a randolph disposición servicios gratuitos de asistencia lingüística. Llame al [phone].    We comply with applicable federal civil rights laws and [place] laws. We do not discriminate on the basis of race, color, national origin, age, disability, sex, sexual orientation, or gender identity.            Thank you!     Thank you for choosing [facility]  for your care. Our goal is always to provide you with excellent care. Hearing back from our patients is one way we can continue to improve our services. Please take a few minutes to complete the written survey that you may receive in the mail after your visit with us. Thank you!             Your Updated Medication List - Protect others around you: Learn how to safely use, store and throw away your medicines at www.disposemymeds.org.          This list is accurate as of 8/2/18  6:02 PM.  Always use your most recent med list.                   Brand Name Dispense Instructions for use Diagnosis    amphetamine-dextroamphetamine 30 MG per tablet    ADDERALL    60 tablet    Take 1 tablet (30 mg) by mouth 2 times  daily    ADHD (attention deficit hyperactivity disorder), combined type       melatonin 1 MG Tabs tablet      Take 1 mg by mouth nightly as needed for sleep        PARoxetine 40 MG tablet    PAXIL    30 tablet    Take 1 tablet (40 mg) by mouth every morning    Adjustment disorder with depressed mood       PROGESTERONE 100MG/G CREAM     30 g    Apply 0.5 g topically 2 times daily.        tretinoin 0.025 % cream    RETIN-A    45 g    Spread a pea size amount into affected area topically at bedtime.  Use sunscreen SPF>20.    Routine general medical examination at a health care facility       VITAMIN D (CHOLECALCIFEROL) PO      Take by mouth daily

## 2018-08-02 NOTE — LETTER
[facility]  [address] [address]  [phone]      August 2, 2018    RE:  Unique Garrison                                                                                                                                                       [address] [place] [id number]            To whom it may concern:    Unique Garrison was seen in clinic today for illness.  She may return to work when she has been symptom free for 24 hours.          Sincerely,        Suyapa Adam [facility]

## 2018-08-07 ENCOUNTER — MYC REFILL (OUTPATIENT)
Dept: INTERNAL MEDICINE | Facility: CLINIC | Age: 53
End: 2018-08-07

## 2018-08-07 DIAGNOSIS — F90.2 ADHD (ATTENTION DEFICIT HYPERACTIVITY DISORDER), COMBINED TYPE: ICD-10-CM

## 2018-08-07 NOTE — TELEPHONE ENCOUNTER
Message from MyChart:  Original authorizing provider: RICKY Wasserman CNP would like a refill of the following medications:  amphetamine-dextroamphetamine (ADDERALL) 30 MG per tablet [RICKY Wasserman CNP]    Preferred pharmacy: [place], [place] - [address] [place] [address] [phone]    Comment:

## 2018-08-08 RX ORDER — DEXTROAMPHETAMINE SACCHARATE, AMPHETAMINE ASPARTATE, DEXTROAMPHETAMINE SULFATE AND AMPHETAMINE SULFATE 7.5; 7.5; 7.5; 7.5 MG/1; MG/1; MG/1; MG/1
30 TABLET ORAL 2 TIMES DAILY
Qty: 60 TABLET | Refills: 0 | Status: SHIPPED | OUTPATIENT
Start: 2018-08-08 | End: 2018-09-10

## 2018-08-09 NOTE — TELEPHONE ENCOUNTER
[facility] Call Center    Phone Message    May a detailed message be left on voicemail: yes    Reason for Call: Other: Pt requesting call back in regards to script of Adderall, pt stated she has not heard anything back yet. Pt would like to pick it up today     Action Taken: Message routed to:  [facility] primary care

## 2018-09-10 ENCOUNTER — HEALTH MAINTENANCE LETTER (OUTPATIENT)
Age: 53
End: 2018-09-10

## 2018-09-10 ENCOUNTER — MYC REFILL (OUTPATIENT)
Dept: FAMILY MEDICINE | Facility: CLINIC | Age: 53
End: 2018-09-10

## 2018-09-10 DIAGNOSIS — F90.2 ADHD (ATTENTION DEFICIT HYPERACTIVITY DISORDER), COMBINED TYPE: ICD-10-CM

## 2018-09-11 NOTE — TELEPHONE ENCOUNTER
Message from MyChart:  Original authorizing provider: Sudhakar Cee MD    Unique Garrison would like a refill of the following medications:  amphetamine-dextroamphetamine (ADDERALL) 30 MG per tablet [Sudhakar Cee MD]    Preferred pharmacy: [address] [phone]    Comment:

## 2018-09-18 RX ORDER — DEXTROAMPHETAMINE SACCHARATE, AMPHETAMINE ASPARTATE, DEXTROAMPHETAMINE SULFATE AND AMPHETAMINE SULFATE 7.5; 7.5; 7.5; 7.5 MG/1; MG/1; MG/1; MG/1
30 TABLET ORAL 2 TIMES DAILY
Qty: 60 TABLET | Refills: 0 | Status: SHIPPED | OUTPATIENT
Start: 2018-09-18 | End: 2018-11-01

## 2018-11-01 ENCOUNTER — MYC REFILL (OUTPATIENT)
Dept: INTERNAL MEDICINE | Facility: CLINIC | Age: 53
End: 2018-11-01

## 2018-11-01 DIAGNOSIS — F90.2 ADHD (ATTENTION DEFICIT HYPERACTIVITY DISORDER), COMBINED TYPE: ICD-10-CM

## 2018-11-01 RX ORDER — DEXTROAMPHETAMINE SACCHARATE, AMPHETAMINE ASPARTATE, DEXTROAMPHETAMINE SULFATE AND AMPHETAMINE SULFATE 7.5; 7.5; 7.5; 7.5 MG/1; MG/1; MG/1; MG/1
30 TABLET ORAL 2 TIMES DAILY
Qty: 60 TABLET | Refills: 0 | Status: SHIPPED | OUTPATIENT
Start: 2018-11-01 | End: 2018-12-03

## 2018-11-01 NOTE — TELEPHONE ENCOUNTER
Message from [facility] [employer] [facility]:  Bria Evans APRN CNP Thu Nov 1, 2018 3:21 PM    We can give her 1 30 day supply but she has not been seen for > 1 year. She need to make an appt.     Bria GARCÍA CNP    ----- Message -----   From: Becky Espana CMA   Sent: 11/1/2018 2:32 PM   To: RICKY Ferrera CNP  Subject: FW: Medication Renewal Request         ----- Message -----   From: Unique Garrison   Sent: 11/1/2018 2:27 PM   To: [facility] Nursing Staff-  Subject: Medication Renewal Request     Original authorizing provider: RICKY Wasserman CNP would like a refill of the following medications:  amphetamine-dextroamphetamine (ADDERALL) 30 MG per tablet [RICKY Wasserman CNP]    Preferred pharmacy: [place], [place] - [address] [place] [address] [phone]    Comment:

## 2018-11-02 ENCOUNTER — TELEPHONE (OUTPATIENT)
Dept: FAMILY MEDICINE | Facility: CLINIC | Age: 53
End: 2018-11-02

## 2018-11-02 NOTE — TELEPHONE ENCOUNTER
[facility] Call [facility]    Phone Message    May a detailed message be left on voicemail: yes    Reason for Call: Other: Pt stated she needs immunization shots/tests as soon as possible, was hoping within the next few days. She was wondering if nurse could do the follow: TB test, Hep B, Pertussis, MMR, Chicken pox testing to see which ones she's had or look it up. If so, she would like to schedule nurse visit. Please call pt back.     Action Taken: Message routed to:  [facility] THEODORE

## 2018-11-07 ENCOUNTER — OFFICE VISIT (OUTPATIENT)
Dept: FAMILY MEDICINE | Facility: CLINIC | Age: 53
End: 2018-11-07
Payer: COMMERCIAL

## 2018-11-07 VITALS
SYSTOLIC BLOOD PRESSURE: 151 MMHG | HEART RATE: 101 BPM | BODY MASS INDEX: 37.88 KG/M2 | TEMPERATURE: 97.8 F | OXYGEN SATURATION: 94 % | DIASTOLIC BLOOD PRESSURE: 86 MMHG | WEIGHT: 245.5 LBS

## 2018-11-07 DIAGNOSIS — Z71.85 IMMUNIZATION COUNSELING: ICD-10-CM

## 2018-11-07 DIAGNOSIS — Z00.00 HEALTH CARE MAINTENANCE: ICD-10-CM

## 2018-11-07 DIAGNOSIS — Z00.00 HEALTH CARE MAINTENANCE: Primary | ICD-10-CM

## 2018-11-07 ASSESSMENT — PAIN SCALES - GENERAL: PAINLEVEL: NO PAIN (0)

## 2018-11-07 NOTE — PROGRESS NOTES
[facility]  Primary Care Center   Sudhakar Cee MD  11/07/2018      Chief Complaint:   Imm/Inj     History of Present Illness:   Unique Garrison is a 53 year old female who presents for evaluation of immunization.    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 She is here today to update immunizations and immunization history as it is required for her as a student in the program.     Other concerns discussed:  - patient mentions that she has had elevated blood pressure readings over the last year. She intends to make an appointment with her PCP to follow up on this. She will take blood pressure readings at home and share these with her PCP.   - In addition to the immunizations that are required for the patient's masters program, she is interested in the Shingrix vaccination. She will call her insurance company to see if this is covered.      Review of Systems:   Pertinent items are noted in HPI, remainder of complete ROS is negative.      Active Medications:      amphetamine-dextroamphetamine (ADDERALL) 30 MG per tablet, Take 1 tablet (30 mg) by mouth 2 times daily, Disp: 60 tablet, Rfl: 0     PARoxetine (PAXIL) 40 MG tablet, Take 1 tablet (40 mg) by mouth every morning, Disp: 30 tablet, Rfl: 3     PROGESTERONE 100MG/G CREAM, Apply 0.5 g topically 2 times daily., Disp: 30 g, Rfl: 6     VITAMIN D, CHOLECALCIFEROL, PO, Take by mouth daily, Disp: , Rfl:      Allergies:   Morphine hcl      Past Medical History:  ADD  Amblyopia   Anxiety   Depression, resolved  Insomnia   Perimenopausal   Tendonitis, bicipital, left  Uterine fibroid  ADHD  Adjustment disorder with depressed mood   Obesity  Calcaneal spur, left  Pain in joint, shoulder region   Medication refill - do not delete   Cobblestone retinal degeneration   Myopia   Occlusion amblyopia   Stress reaction of bone     Past Surgical History:  Arthroscopy shoulder rotator cuff repair   Oral surgery single tooth  Gum grafting    LASIK customvue bilateral   Tonsillectomy   [place] screening    Family History:   Coronary artery disease father   Lymphoma, 30s - brother   Prostate cancer - father   Arthritis - paternal grandmother   Depression - sister   Hypertension - father   Lipids - father   Osteoporosis - mother  Lymphoma - mother   Cancer - brother      Social History:   The patient was alone  Smoking Status: former cigarette smoker (0.50 PPD/30 years)   Smokeless Tobacco: never   Alcohol Use: yes   Marital Status: single   Employment status: works at the [employer]      Physical Exam:   /86  Pulse 101  Temp 97.8  F (36.6  C) (Oral)  Wt 111.4 kg (245 lb 8 oz)  LMP 04/28/2016  SpO2 94%  Breastfeeding? No  BMI 37.88 kg/m2     Constitutional: Alert. In no distress.  Head: The scalp, face, and head appear normal.  Musculoskeletal: Extremities appear normal. No gross deformities noted.   Neurologic: Gait normal. Speech is normal and fluent.  Psychiatric: Mentation appears normal. Normal affect.       Assessment and Plan:  Health care maintenance  Shots required for school. We will check titers and give shots as appropriate. I also suggested that if she gets the Hep B vaccination for school, that we due the Hep A combo. We discussed the Shingrix vaccination.  - Varicella Zoster Virus Antibody IgG  - Hepatitis B core antibody  - Hepatitis B Surface Antibody  - Hepatitis B surface antigen  - Rubella Antibody IgG Quantitative  - Rubeola Antibody IgG  - Mumps Antibody IgG  - Hepatitis Antibody A IgG  - M Tuberculosis by Quantiferon     Elevated blood pressure  She will check blood pressure at home and make a list of recordings to bring with her at a future visit.     Follow-up: pending labs and for BP check        Scribe Disclosure:   Nola GILL, am serving as a scribe to document services personally performed by Sudhakar Cee MD at this visit, based upon the provider's statements to me. All documentation has  been reviewed by the aforementioned provider prior to being entered into the official medical record.     Portions of this medical record were completed by a scribe. UPON MY REVIEW AND AUTHENTICATION BY ELECTRONIC SIGNATURE, this confirms (a) I performed the applicable clinical services, and (b) the record is accurate.   Sudhakar Cee MD

## 2018-11-07 NOTE — MR AVS SNAPSHOT
After Visit Summary   11/7/2018    Unique Garrison    MRN: [id number]           Patient Information     Date Of Birth          1965        Visit Information        Provider Department      11/7/2018 1:50 PM Sudhakar Cee MD [facility]        Today's Diagnoses     Health care maintenance    -  1      Care Instructions    Primary Care Center Medication Refill Request Information:  * Please contact your pharmacy regarding ANY request for medication refills.  ** [facility] Prescription Fax = [phone]  * Please allow 3 business days for routine medication refills.  * Please allow 5 business days for controlled substance medication refills.     Primary Care Center Test Result notification information:  *You will be notified with in 7-10 days of your appointment day regarding the results of your test.  If you are on MyChart you will be notified as soon as the provider has reviewed the results and signed off on them.    [facility]: [phone]             Follow-ups after your visit        Future tests that were ordered for you today     Open Future Orders        Priority Expected Expires Ordered    Hepatitis Antibody A IgG Routine 11/7/2018 11/7/2019 11/7/2018    M Tuberculosis by Quantiferon Routine 11/7/2018 11/7/2019 11/7/2018    Varicella Zoster Virus Antibody IgG Routine 11/7/2018 11/7/2019 11/7/2018    Hepatitis B core antibody Routine 11/7/2018 11/7/2019 11/7/2018    Hepatitis B Surface Antibody Routine 11/7/2018 11/7/2019 11/7/2018    Hepatitis B surface antigen Routine 11/7/2018 11/7/2019 11/7/2018    Rubella Antibody IgG Quantitative Routine 11/7/2018 11/7/2019 11/7/2018    Rubeola Antibody IgG Routine 11/7/2018 11/7/2019 11/7/2018    Mumps Antibody IgG Routine 11/7/2018 11/7/2019 11/7/2018            Who to contact     Please call your clinic at [phone] to:    Ask questions about your health    Make or cancel appointments    Discuss your medicines    Learn  about your test results    Speak to your doctor            Additional Information About Your Visit        [employer] Information     [employer] gives you secure access to your electronic health record. If you see a primary care provider, you can also send messages to your care team and make appointments. If you have questions, please call your primary care clinic.  If you do not have a primary care provider, please call [phone] and they will assist you.      [employer] is an electronic gateway that provides easy, online access to your medical records. With [employer], you can request a clinic appointment, read your test results, renew a prescription or communicate with your care team.     To access your existing account, please contact your [facility] or call [phone] for assistance.        Care EveryWhere ID     This is your Care EveryWhere ID. This could be used by other organizations to access your [place] medical records  [id number]        Your Vitals Were     Pulse Temperature Last Period Pulse Oximetry Breastfeeding? BMI (Body Mass Index)    101 97.8  F (36.6  C) (Oral) 04/28/2016 94% No 37.88 kg/m2       Blood Pressure from Last 3 Encounters:   11/07/18 151/86   08/02/18 145/84   09/12/17 153/87    Weight from Last 3 Encounters:   11/07/18 111.4 kg (245 lb 8 oz)   08/02/18 109.9 kg (242 lb 5 oz)   10/11/17 98.9 kg (218 lb)                 Today's Medication Changes          These changes are accurate as of 11/7/18  2:30 PM.  If you have any questions, ask your nurse or doctor.               Stop taking these medicines if you haven't already. Please contact your care team if you have questions.     melatonin 1 MG Tabs tablet   Stopped by:  Sudhakar Cee MD                    Primary Care Provider Office Phone # Fax #    Bria RICKY Cristina [phone]       [address] [place] [address] 921  [facility] [address]        Equal Access to Services     KAYDEN BHAKTA AH:  Hadii andrew winston Socarolali, waaxda luqadaha, qaybta kazay dudley, gurwinder mandiin hayaan katiedebra austin laalainamirela pee. So [facility] [phone].    ATENCIÓN: Si alexandro ashby, tiene a randolph disposición servicios gratuitos de asistencia lingüística. Llame al [phone].    We comply with applicable federal civil rights laws and [place] laws. We do not discriminate on the basis of race, color, national origin, age, disability, sex, sexual orientation, or gender identity.            Thank you!     Thank you for choosing [facility]  for your care. Our goal is always to provide you with excellent care. Hearing back from our patients is one way we can continue to improve our services. Please take a few minutes to complete the written survey that you may receive in the mail after your visit with us. Thank you!             Your Updated Medication List - Protect others around you: Learn how to safely use, store and throw away your medicines at www.disposemymeds.org.          This list is accurate as of 11/7/18  2:30 PM.  Always use your most recent med list.                   Brand Name Dispense Instructions for use Diagnosis    amphetamine-dextroamphetamine 30 MG per tablet    ADDERALL    60 tablet    Take 1 tablet (30 mg) by mouth 2 times daily    ADHD (attention deficit hyperactivity disorder), combined type       PARoxetine 40 MG tablet    PAXIL    30 tablet    Take 1 tablet (40 mg) by mouth every morning    Adjustment disorder with depressed mood       PROGESTERONE 100MG/G CREAM     30 g    Apply 0.5 g topically 2 times daily.        tretinoin 0.025 % cream    RETIN-A    45 g    Spread a pea size amount into affected area topically at bedtime.  Use sunscreen SPF>20.    Routine general medical examination at a health care facility       VITAMIN D (CHOLECALCIFEROL) PO      Take by mouth daily

## 2018-11-07 NOTE — PATIENT INSTRUCTIONS
[facility] Medication Refill Request Information:  * Please contact your pharmacy regarding ANY request for medication refills.  ** [facility] Prescription Fax = [phone]  * Please allow 3 business days for routine medication refills.  * Please allow 5 business days for controlled substance medication refills.     [facility] Test Result notification information:  *You will be notified with in 7-10 days of your appointment day regarding the results of your test.  If you are on MyChart you will be notified as soon as the provider has reviewed the results and signed off on them.    [facility]: [phone]

## 2018-11-07 NOTE — NURSING NOTE
Chief Complaint   Patient presents with     Imm/Inj     Patient is a student at the [employer] and needs immunization in order to attend class. She has a list of immunization she needs.        Jackie Vega, Clinic EMT at 1:45 PM on 11/7/2018

## 2018-11-08 LAB
HAV IGG SER QL IA: NONREACTIVE
HBV CORE AB SERPL QL IA: NONREACTIVE
HBV SURFACE AB SERPL IA-ACNC: 0.51 M[IU]/ML
HBV SURFACE AG SERPL QL IA: NONREACTIVE
MEV IGG SER QL IA: 6.3 AI (ref 0–0.8)
MUV IGG SER QL IA: 5 AI (ref 0–0.8)
RUBV IGG SERPL IA-ACNC: >250 IU/ML
VZV IGG SER QL IA: >8 AI (ref 0–0.8)

## 2018-11-09 ENCOUNTER — MYC MEDICAL ADVICE (OUTPATIENT)
Dept: INTERNAL MEDICINE | Facility: CLINIC | Age: 53
End: 2018-11-09

## 2018-11-09 LAB
GAMMA INTERFERON BACKGROUND BLD IA-ACNC: 0.03 IU/ML
M TB IFN-G BLD-IMP: NEGATIVE
M TB IFN-G CD4+ BCKGRND COR BLD-ACNC: 3.68 IU/ML
MITOGEN IGNF BCKGRD COR BLD-ACNC: 0 IU/ML
MITOGEN IGNF BCKGRD COR BLD-ACNC: 0 IU/ML

## 2018-11-09 NOTE — TELEPHONE ENCOUNTER
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 One now, one in one mo, one in six mo

## 2018-11-16 ENCOUNTER — OFFICE VISIT (OUTPATIENT)
Dept: FAMILY MEDICINE | Facility: CLINIC | Age: 53
End: 2018-11-16
Payer: COMMERCIAL

## 2018-11-16 VITALS
HEART RATE: 84 BPM | WEIGHT: 244.5 LBS | RESPIRATION RATE: 16 BRPM | BODY MASS INDEX: 37.73 KG/M2 | DIASTOLIC BLOOD PRESSURE: 87 MMHG | SYSTOLIC BLOOD PRESSURE: 142 MMHG

## 2018-11-16 DIAGNOSIS — Z23 NEED FOR VACCINATION: Primary | ICD-10-CM

## 2018-11-16 DIAGNOSIS — R03.0 ELEVATED BLOOD PRESSURE READING WITHOUT DIAGNOSIS OF HYPERTENSION: ICD-10-CM

## 2018-11-16 DIAGNOSIS — R60.0 LOCALIZED EDEMA: ICD-10-CM

## 2018-11-16 LAB — INTERPRETATION ECG - MUSE: NORMAL

## 2018-11-16 RX ORDER — VITAMIN B COMPLEX
CAPSULE ORAL
COMMUNITY
Start: 2018-09-01

## 2018-11-16 RX ORDER — DIPHENHYD/PHENYLEPH/ACETAMINOP 12.5-5-325
TABLET ORAL
Qty: 1 KIT | Refills: 0 | Status: SHIPPED | OUTPATIENT
Start: 2018-11-16

## 2018-11-16 ASSESSMENT — PAIN SCALES - GENERAL: PAINLEVEL: NO PAIN (0)

## 2018-11-16 NOTE — NURSING NOTE
Chief Complaint   Patient presents with     Hypertension     Patient is here for blood pressure check     Imm/Inj     Also here for vaccines needed       Mingo Dumont CMA ([facility]) at 10:11 AM on 11/16/2018

## 2018-11-16 NOTE — PROGRESS NOTES
[facility]  Primary Care Center   Sudhakar Cee MD  11/16/2018      Chief Complaint:   Hypertension and Imm/Inj       History of Present Illness:   Unique Garrison is a 53 year old female with a history of ADHD, retinal degeneration, and depression/anxiety who presents for evaluation of hypertension. Blood pressure has been high in recent clinic visits. This is concerning to her due to other symptoms. Her ankles started swelling during the summer. She was taking something over the counter as a diuretic but doesn't think it is working. Swelling worsens as the day goes on. Today her ankles were normal but her hands were puffy. She rarely gets a full night of sleep because she is working and a full time student. She does not wake up short of breath or gasping. She gets some exercise twice a week with circuit weight training, high reps with low weight. She has been doing this for 3 years and still can without issue other than loss of strength. She overall tolerates the workout well, no chest pain, excess shortness of breath, and more than her baseline sweating, she does have a history of sweating excessively even when in shape. Her paternal side have been short lived due to to heart trouble, she believes her father is the first to live past his 50s and has a pacemaker. Her weight increased about 25 pounds in the last year. She thinks this is water weight, recently she went out and ate a lot of food but was 8 pounds lighter the next morning the previous day. Previously did Yanci's diet and lost about 50 pounds in 3 months. Has about one beer per day. Quit smoking regularly, at most she smoked 4 cigarettes per day.     Healthcare/Maintenance topics discussed:  1.Hep A/B vaccination today.  2. TB test negative    Review of Systems:   Pertinent items are noted in HPI, remainder of complete ROS is negative.      Active Medications:     Current Outpatient Prescriptions:      amphetamine-dextroamphetamine (ADDERALL) 30 MG  per tablet, Take 1 tablet (30 mg) by mouth 2 times daily, Disp: 60 tablet, Rfl: 0     B Complex CAPS, , Disp: , Rfl:      Blood Pressure Monitoring (BLOOD PRESSURE KIT) KIT, Check home blood pressure daily and keep list for MD visits, Disp: 1 kit, Rfl: 0     CALCIUM PO, , Disp: , Rfl:      LevOCARNitine (CARNITINE PO), , Disp: , Rfl:      MAGNESIUM PO, , Disp: , Rfl:      PARoxetine (PAXIL) 40 MG tablet, Take 1 tablet (40 mg) by mouth every morning, Disp: 30 tablet, Rfl: 3     PROGESTERONE 100MG/G CREAM, Apply 0.5 g topically 2 times daily., Disp: 30 g, Rfl: 6     VITAMIN D, CHOLECALCIFEROL, PO, Take by mouth daily, Disp: , Rfl:      tretinoin (RETIN-A) 0.025 % cream, Spread a pea size amount into affected area topically at bedtime.  Use sunscreen SPF>20. (Patient not taking: Reported on 9/12/2017), Disp: 45 g, Rfl: 11      Allergies:   Morphine hcl      Past Medical History:  ADHD   Amblyopia   Anxiety  Depression  Insomnia  Uterine fibroid  [place] retinal degeneration     Past Surgical History:  Arthroscopic rotator cuff repair, right  Oral surgery  Gum grafting  Lasik  Tonsillectomy  [place] screening    Family History:   Father: coronary artery disease, prostate cancer, hypertension, hyperlipidemia   Brother: lymphoma  Paternal grandmother: arthritis  Mother: osteoporosis   Sister: depression     Social History:   Unmarried  Former 0.5 PPD smoker for 30 years, quit 2013    Physical Exam:   /87 (BP Location: Right arm, Patient Position: Sitting, Cuff Size: Adult Large)  Pulse 84  Resp 16  Wt 110.9 kg (244 lb 8 oz)  LMP 04/28/2016  Breastfeeding? No  BMI 37.73 kg/m2   Constitutional: Alert. In no distress.  Head: Normocephalic. No masses, lesions, tenderness or abnormalities.  ENT: No neck nodes or sinus tenderness.  Cardiovascular: RRR. No murmurs, clicks, gallops, or rub.  Respiratory: Clear to auscultation bilaterally, no wheezes or crackles.  Gastrointestinal: Abdomen soft. Non-tender. BS  normal. No masses or organomegaly.  Musculoskeletal: Extremities normal. No gross deformities noted. Normal muscle tone. 1+ bilateral leg edema  Skin: No suspicious lesions. No rashes.  Neurologic: Gait normal. Reflexes normal and symmetric. Sensation grossly WNL.  Psychiatric: Mentation appears normal. Normal affect.   Hematologic/Lymphatic/Immunologic: Normal cervical lymph nodes.      EKG:  Indication:  Hypertension   Time:   11:13  Impression: Normal EKG   Vent. Rate: 69  R-axis:  -14    BP Readings from Last 5 Encounters:   11/16/18 142/87   11/07/18 151/86   08/02/18 145/84   09/12/17 153/87   05/03/16 167/78     Wt Readings from Last 5 Encounters:   11/16/18 110.9 kg (244 lb 8 oz)   11/07/18 111.4 kg (245 lb 8 oz)   08/02/18 109.9 kg (242 lb 5 oz)   10/11/17 98.9 kg (218 lb)   07/12/17 98.9 kg (218 lb)       Assessment and Plan:  Need for vaccination  Will come back for Twinrix booster in 6 months.  - HEPATITIS A AND B VACCINE (TWINRIX), ADULT    Elevated blood pressure reading without diagnosis of hypertension  She will get blood pressure machine at store and report back with readings. Advised her to reduce salt in diet and work on other lifestyle changes.   - EKG Performed in Clinic w/ Provider Reading Fee  - Echocardiogram  - CBC with platelets differential  - Comprehensive metabolic panel  - N terminal pro BNP  - Lipid panel reflex to direct LDL Fasting  - TSH with free T4 reflex  - Blood Pressure Monitoring (BLOOD PRESSURE KIT) KIT  Dispense: 1 kit; Refill: 0    Localized edema  Could be a component of venous insufficiency and we can consider ultrasounds for that. Unlikely to be heart failure given she can do bootcamp session without cardiopulmonary symptoms. She is concerned because family history of early heart disease, hyperlipidemia, and smoking. Will get EKG, BNP and echo. CMP for non cardiac edema. EKG was normal today in clinic.   - EKG Performed in Clinic w/ Provider Reading Fee  -  Echocardiogram  - CBC with platelets differential  - Comprehensive metabolic panel  - N terminal pro BNP  - Lipid panel reflex to direct LDL Fasting  - TSH with free T4 reflex    Obesity  She may try a low carb diet.     Follow-up: One month         Scribe Disclosure:   I, Mor Doe, am serving as a scribe to document services personally performed by Sudhakar Cee MD at this visit, based upon the provider's statements to me. All documentation has been reviewed by the aforementioned provider prior to being entered into the official medical record.     Portions of this medical record were completed by a scribe. UPON MY REVIEW AND AUTHENTICATION BY ELECTRONIC SIGNATURE, this confirms (a) I performed the applicable clinical services, and (b) the record is accurate.   Sudhakar Cee MD

## 2018-11-16 NOTE — MR AVS SNAPSHOT
After Visit Summary   11/16/2018    Unique Garrison    MRN: [id number]           Patient Information     Date Of Birth          1965        Visit Information        Provider Department      11/16/2018 9:50 AM Sudhakar Cee MD [facility]        Today's Diagnoses     Need for vaccination    -  1    Elevated blood pressure reading without diagnosis of hypertension        Localized edema          Care Instructions    Primary Care Center Medication Refill Request Information:  * Please contact your pharmacy regarding ANY request for medication refills.  ** [facility] Prescription Fax = [phone]  * Please allow 3 business days for routine medication refills.  * Please allow 5 business days for controlled substance medication refills.     Primary Care Center Test Result notification information:  *You will be notified with in 7-10 days of your appointment day regarding the results of your test.  If you are on MyChart you will be notified as soon as the provider has reviewed the results and signed off on them.    Primary Care Center [phone]       Your 2nd dose of Twinrix vaccine will be due 1-2 months around 12/16/18-01/16/19.  Your 3rd dose of Twinrix vaccine will be due in 6 months from today around 05/16/19.    Nurse Visit hours are available Monday, Wednesday, and Friday from 9:00 AM-11:00 AM and 1:00 PM- 3:00 PM at [phone].           Follow-ups after your visit        Follow-up notes from your care team     Return in about 1 month (around 12/16/2018).      Your next 10 appointments already scheduled     Nov 26, 2018  8:15 AM CST   LAB with  LAB    Health Lab ([facility])    [address] [place] [id number] [phone]           Please do not eat 10-12 hours before your appointment if you are coming in fasting for labs on lipids, cholesterol, or glucose (sugar). This does not apply to pregnant women. Water, hot tea  and black coffee (with nothing added) are okay. Do not drink other fluids, diet soda or chew gum.            Nov 26, 2018  8:30 AM CST   Ech Complete with [place]   [facility] Echo ([facility])    [address] [place] [id number] [phone]           1.  Please bring or wear a comfortable two-piece outfit. 2.  You may eat, drink and take your normal medicines. 3.  For any questions that cannot be answered, please contact the ordering physician 4.  Please do not wear perfumes or scented lotions on the day of your exam.            Dec 17, 2018 10:20 AM CST   (Arrive by 10:05 AM)   Return Visit with Sudhakar Cee MD   [facility] ([facility])    [address] [place] [id number] [phone]            May 17, 2019  9:00 AM CDT   Nurse Visit with [facility] Nurse   [facility] ([facility])    [address] [place] [id number] [phone]              Future tests that were ordered for you today     Open Future Orders        Priority Expected Expires Ordered    Echocardiogram Routine  11/16/2019 11/16/2018    CBC with platelets differential Routine 11/16/2018 11/30/2018 11/16/2018    Comprehensive metabolic panel Routine 11/16/2018 11/30/2018 11/16/2018    N terminal pro BNP Routine 11/16/2018 11/16/2019 11/16/2018    Lipid panel reflex to direct LDL Fasting Routine 11/16/2018 11/30/2018 11/16/2018    TSH with free T4 reflex Routine 11/16/2018 11/30/2018 11/16/2018            Who to contact     Please call your clinic at [phone] to:    Ask questions about your health    Make or cancel appointments    Discuss your medicines    Learn about your test results    Speak to your doctor            Additional Information About Your Visit        MyChart Information     [employer] gives you secure access to your electronic health record.  If you see a primary care provider, you can also send messages to your care team and make appointments. If you have questions, please call your primary care clinic.  If you do not have a primary care provider, please call [phone] and they will assist you.      [employer] is an electronic gateway that provides easy, online access to your medical records. With [employer], you can request a clinic appointment, read your test results, renew a prescription or communicate with your care team.     To access your existing account, please contact your [facility] or call [phone] for assistance.        Care EveryWhere ID     This is your Care EveryWhere ID. This could be used by other organizations to access your [place] medical records  [id number]        Your Vitals Were     Pulse Respirations Last Period Breastfeeding? BMI (Body Mass Index)       84 16 04/28/2016 No 37.73 kg/m2        Blood Pressure from Last 3 Encounters:   11/16/18 142/87   11/07/18 151/86   08/02/18 145/84    Weight from Last 3 Encounters:   11/16/18 110.9 kg (244 lb 8 oz)   11/07/18 111.4 kg (245 lb 8 oz)   08/02/18 109.9 kg (242 lb 5 oz)              We Performed the Following     EKG Performed in Clinic w/ Provider Reading Fee     HEPATITIS A AND B VACCINE (TWINRIX), ADULT          Today's Medication Changes          These changes are accurate as of 11/16/18 11:42 AM.  If you have any questions, ask your nurse or doctor.               Start taking these medicines.        Dose/Directions    blood pressure kit Kit   Used for:  Elevated blood pressure reading without diagnosis of hypertension   Started by:  Sudhakar Cee MD        Check home blood pressure daily and keep list for MD visits   Quantity:  1 kit   Refills:  0            Where to get your medicines      Some of these will need a paper prescription and others can be bought over the counter.  Ask your nurse if you have questions.     Bring a paper  prescription for each of these medications     blood pressure kit Kit                Primary Care Provider Office Phone # Fax #    Bria Evans, APRN [phone]       [address] [place] [address]        Equal Access to Services     KAYDEN BHAKTA : Hadii andrew corrales hadandryo Soomaali, waaxda luqadaha, qaybta kaalmada adeegyada, waxay idiin hayaan adedebra lamonterolo glasgow. So [facility] [phone].    ATENCIÓN: Si habla español, tiene a randolph disposición servicios gratuitos de asistencia lingüística. Llame al [phone].    We comply with applicable federal civil rights laws and [place] laws. We do not discriminate on the basis of race, color, national origin, age, disability, sex, sexual orientation, or gender identity.            Thank you!     Thank you for choosing [facility]  for your care. Our goal is always to provide you with excellent care. Hearing back from our patients is one way we can continue to improve our services. Please take a few minutes to complete the written survey that you may receive in the mail after your visit with us. Thank you!             Your Updated Medication List - Protect others around you: Learn how to safely use, store and throw away your medicines at www.disposemymeds.org.          This list is accurate as of 11/16/18 11:42 AM.  Always use your most recent med list.                   Brand Name Dispense Instructions for use Diagnosis    amphetamine-dextroamphetamine 30 MG per tablet    ADDERALL    60 tablet    Take 1 tablet (30 mg) by mouth 2 times daily    ADHD (attention deficit hyperactivity disorder), combined type       B Complex Caps           blood pressure kit Kit     1 kit    Check home blood pressure daily and keep list for MD visits    Elevated blood pressure reading without diagnosis of hypertension       CALCIUM PO           CARNITINE PO           MAGNESIUM PO           PARoxetine 40 MG tablet    PAXIL    30 tablet    Take  1 tablet (40 mg) by mouth every morning    Adjustment disorder with depressed mood       PROGESTERONE 100MG/G CREAM     30 g    Apply 0.5 g topically 2 times daily.        tretinoin 0.025 % cream    RETIN-A    45 g    Spread a pea size amount into affected area topically at bedtime.  Use sunscreen SPF>20.    Routine general medical examination at a health care facility       VITAMIN D (CHOLECALCIFEROL) PO      Take by mouth daily

## 2018-11-16 NOTE — NURSING NOTE
Administered Twinrix vaccine (see Immunizations in Chart Review). Patient tolerated well.  Mingo Dumont CMA at 10:21 AM on 11/16/2018

## 2018-11-16 NOTE — PATIENT INSTRUCTIONS
[facility] Medication Refill Request Information:  * Please contact your pharmacy regarding ANY request for medication refills.  ** [facility] Prescription Fax = [phone]  * Please allow 3 business days for routine medication refills.  * Please allow 5 business days for controlled substance medication refills.     [facility] Test Result notification information:  *You will be notified with in 7-10 days of your appointment day regarding the results of your test.  If you are on MyChart you will be notified as soon as the provider has reviewed the results and signed off on them.    [facility] [phone]       Your 2nd dose of Twinrix vaccine will be due 1-2 months around 12/16/18-01/16/19.  Your 3rd dose of Twinrix vaccine will be due in 6 months from today around 05/16/19.    Nurse Visit hours are available Monday, Wednesday, and Friday from 9:00 AM-11:00 AM and 1:00 PM- 3:00 PM at [phone].

## 2018-11-26 ENCOUNTER — RADIANT APPOINTMENT (OUTPATIENT)
Dept: CARDIOLOGY | Facility: CLINIC | Age: 53
End: 2018-11-26
Attending: FAMILY MEDICINE
Payer: COMMERCIAL

## 2018-11-26 DIAGNOSIS — R60.0 LOCALIZED EDEMA: ICD-10-CM

## 2018-11-26 DIAGNOSIS — R03.0 ELEVATED BLOOD PRESSURE READING WITHOUT DIAGNOSIS OF HYPERTENSION: ICD-10-CM

## 2018-11-26 LAB
ALBUMIN SERPL-MCNC: 3.7 G/DL (ref 3.4–5)
ALP SERPL-CCNC: 76 U/L (ref 40–150)
ALT SERPL W P-5'-P-CCNC: 56 U/L (ref 0–50)
ANION GAP SERPL CALCULATED.3IONS-SCNC: 8 MMOL/L (ref 3–14)
AST SERPL W P-5'-P-CCNC: 29 U/L (ref 0–45)
BASOPHILS # BLD AUTO: 0 10E9/L (ref 0–0.2)
BASOPHILS NFR BLD AUTO: 0.5 %
BILIRUB SERPL-MCNC: 0.5 MG/DL (ref 0.2–1.3)
BUN SERPL-MCNC: 8 MG/DL (ref 7–30)
CALCIUM SERPL-MCNC: 8.9 MG/DL (ref 8.5–10.1)
CHLORIDE SERPL-SCNC: 104 MMOL/L (ref 94–109)
CHOLEST SERPL-MCNC: 238 MG/DL
CO2 SERPL-SCNC: 27 MMOL/L (ref 20–32)
CREAT SERPL-MCNC: 0.62 MG/DL (ref 0.52–1.04)
DIFFERENTIAL METHOD BLD: NORMAL
EOSINOPHIL # BLD AUTO: 0.5 10E9/L (ref 0–0.7)
EOSINOPHIL NFR BLD AUTO: 6.2 %
ERYTHROCYTE [DISTWIDTH] IN BLOOD BY AUTOMATED COUNT: 11.9 % (ref 10–15)
GFR SERPL CREATININE-BSD FRML MDRD: >90 ML/MIN/1.7M2
GLUCOSE SERPL-MCNC: 113 MG/DL (ref 70–99)
HCT VFR BLD AUTO: 46.1 % (ref 35–47)
HDLC SERPL-MCNC: 44 MG/DL
HGB BLD-MCNC: 15.3 G/DL (ref 11.7–15.7)
IMM GRANULOCYTES # BLD: 0.1 10E9/L (ref 0–0.4)
IMM GRANULOCYTES NFR BLD: 0.7 %
LDLC SERPL CALC-MCNC: 156 MG/DL
LYMPHOCYTES # BLD AUTO: 1.4 10E9/L (ref 0.8–5.3)
LYMPHOCYTES NFR BLD AUTO: 19.2 %
MCH RBC QN AUTO: 32.1 PG (ref 26.5–33)
MCHC RBC AUTO-ENTMCNC: 33.2 G/DL (ref 31.5–36.5)
MCV RBC AUTO: 97 FL (ref 78–100)
MONOCYTES # BLD AUTO: 0.5 10E9/L (ref 0–1.3)
MONOCYTES NFR BLD AUTO: 7 %
NEUTROPHILS # BLD AUTO: 4.8 10E9/L (ref 1.6–8.3)
NEUTROPHILS NFR BLD AUTO: 66.4 %
NONHDLC SERPL-MCNC: 193 MG/DL
NRBC # BLD AUTO: 0 10*3/UL
NRBC BLD AUTO-RTO: 0 /100
NT-PROBNP SERPL-MCNC: 29 PG/ML (ref 0–125)
PLATELET # BLD AUTO: 259 10E9/L (ref 150–450)
POTASSIUM SERPL-SCNC: 4.2 MMOL/L (ref 3.4–5.3)
PROT SERPL-MCNC: 7.5 G/DL (ref 6.8–8.8)
RBC # BLD AUTO: 4.77 10E12/L (ref 3.8–5.2)
SODIUM SERPL-SCNC: 139 MMOL/L (ref 133–144)
TRIGL SERPL-MCNC: 187 MG/DL
TSH SERPL DL<=0.005 MIU/L-ACNC: 3.05 MU/L (ref 0.4–4)
WBC # BLD AUTO: 7.3 10E9/L (ref 4–11)

## 2018-12-03 ENCOUNTER — MYC REFILL (OUTPATIENT)
Dept: INTERNAL MEDICINE | Facility: CLINIC | Age: 53
End: 2018-12-03

## 2018-12-03 DIAGNOSIS — F90.2 ADHD (ATTENTION DEFICIT HYPERACTIVITY DISORDER), COMBINED TYPE: ICD-10-CM

## 2018-12-04 RX ORDER — DEXTROAMPHETAMINE SACCHARATE, AMPHETAMINE ASPARTATE, DEXTROAMPHETAMINE SULFATE AND AMPHETAMINE SULFATE 7.5; 7.5; 7.5; 7.5 MG/1; MG/1; MG/1; MG/1
30 TABLET ORAL 2 TIMES DAILY
Qty: 60 TABLET | Refills: 0 | Status: SHIPPED | OUTPATIENT
Start: 2018-12-04 | End: 2019-02-01

## 2018-12-04 NOTE — TELEPHONE ENCOUNTER
Message from Dylan:  Becky Espana CMA Mon Dec 3, 2018 1:35 PM    Controlled substance    ----- Message -----   From: Unique Garrison   Sent: 12/3/2018 1:28 PM   To: [facility] Nursing Staff-  Subject: Medication Renewal Request     Original authorizing provider: RICKY Wasserman CNP would like a refill of the following medications:  amphetamine-dextroamphetamine (ADDERALL) 30 MG per tablet [RICKY Wasserman CNP]    Preferred pharmacy: [facility] [place], [place] - [address] [place] [address] [phone]    Comment:

## 2018-12-11 ENCOUNTER — PATIENT OUTREACH (OUTPATIENT)
Dept: CARE COORDINATION | Facility: CLINIC | Age: 53
End: 2018-12-11

## 2018-12-17 ENCOUNTER — OFFICE VISIT (OUTPATIENT)
Dept: FAMILY MEDICINE | Facility: CLINIC | Age: 53
End: 2018-12-17
Payer: COMMERCIAL

## 2018-12-17 VITALS
TEMPERATURE: 97.7 F | SYSTOLIC BLOOD PRESSURE: 155 MMHG | BODY MASS INDEX: 37.96 KG/M2 | OXYGEN SATURATION: 97 % | WEIGHT: 246 LBS | HEART RATE: 84 BPM | DIASTOLIC BLOOD PRESSURE: 85 MMHG | RESPIRATION RATE: 16 BRPM

## 2018-12-17 DIAGNOSIS — M25.511 RIGHT SHOULDER PAIN, UNSPECIFIED CHRONICITY: ICD-10-CM

## 2018-12-17 DIAGNOSIS — R03.0 ELEVATED BLOOD PRESSURE READING WITHOUT DIAGNOSIS OF HYPERTENSION: Primary | ICD-10-CM

## 2018-12-17 ASSESSMENT — PAIN SCALES - GENERAL: PAINLEVEL: MODERATE PAIN (5)

## 2018-12-17 NOTE — PROGRESS NOTES
[facility]  Primary Care Center   Sudhakar Cee MD  12/17/2018      Chief Complaint:   Hypertension; Imm/Inj; and Back Pain     History of Present Illness:   Unique Garrison is a 53 year old female who presents for follow up on hypertension; for immunizations, and to discuss back pain.    Hypertension   Patient is checking her BP at home, and is getting about 150-153 systolic at home, similar to today's reading in clinic. She is not currently on a blood pressure medication and has never taken one before. Patient states that she thinks that she can improve this by working on losing weight. She has had success with a low-carb diet in the past, and would like to try this again.     Back pain  The patient reports upper back and shoulder pain when coughing. This is new as of 3-4 weeks ago, and it was not initially associated with the cough (she states that she got a cold from work and she is not concerned about the cold as much). Her pain is in her right side every time she coughs. The pain does not feel muscular in etiology. With moving her arm, she does not have pain but she states that she has noticed it is worse with looking up and twisting her head to the right. The pain is not aggravated with deep inspiration, or carrying a heavy bag--she has been lifting weights and did not feel it then. Of note, the patient had rotator cuff surgery on her shoulder a few years ago with an anchor placed for her biceps, as it had torn on her right arm.     Other concerns discussed:  1) Patient here for 2nd dose of her Twinrix vaccination. She states she still has to check with her insurance on Shingrix coverage.      Review of Systems:   Pertinent items are noted in HPI, remainder of complete ROS is negative.      Active Medications:      amphetamine-dextroamphetamine (ADDERALL) 30 MG tablet, Take 1 tablet (30 mg) by mouth 2 times daily, Disp: 60 tablet, Rfl: 0     B Complex CAPS, , Disp: , Rfl:      Blood Pressure Monitoring  (BLOOD PRESSURE KIT) KIT, Check home blood pressure daily and keep list for MD visits, Disp: 1 kit, Rfl: 0     LevOCARNitine (CARNITINE PO), Take L-Carnitine, Disp: , Rfl:      MAGNESIUM PO, , Disp: , Rfl:      PROGESTERONE 100MG/G CREAM, Apply 0.5 g topically 2 times daily., Disp: 30 g, Rfl: 6     VITAMIN D, CHOLECALCIFEROL, PO, Take by mouth daily, Disp: , Rfl:      Allergies:   Morphine hcl      Past Medical History:  ADD  Amblyopia   Anxiety  Depression  Insomnia  Perimenopausal   Tendonitis   Uterine fibroid   Obesity  Calcaneal spur, left   Cobblestone retinal degeneration   Stress reaction of bone      Past Surgical History:  Arthroscopy shoulder rotator cuff repair  Oral surgery single tooth   Lasik customvue bilateral  Gum grafting   Tonsillectomy   Wave scan screening     Family History:   Coronary artery disease  - Father   Lymphoma - Brother, Mother   Prostate cancer - Father   Arthritis - paternal grandmother   Depression - Sister   Hypertension - Father   Lipids - Father   Osteoporosis - Mother     Social History:  The patient was alone.  Smoking Status: Former smoker 0.5 PPD for 30 years   Smokeless Tobacco: Never   Alcohol Use: Yes       Physical Exam:   /85 (BP Location: Right arm, Patient Position: Sitting, Cuff Size: Adult Large)   Pulse 84   Temp 97.7  F (36.5  C) (Oral)   Resp 16   Wt 111.6 kg (246 lb)   LMP 04/28/2016   SpO2 97%   Breastfeeding? No   BMI 37.96 kg/m       Constitutional: Alert. In no distress.  Head: Normocephalic. No masses, lesions, tenderness or abnormalities.  ENT: No neck nodes or sinus tenderness.  Cardiovascular: RRR. No murmurs, clicks, gallops, or rub.  Respiratory: Clear to auscultation bilaterally, no wheezes or crackles.  Gastrointestinal: Abdomen soft. Non-tender. BS normal. No masses or organomegaly.  Musculoskeletal: Extremities normal. No gross deformities noted. Normal muscle tone. Neck and shoulder girdle area non tender except for right  suprascapular tenderness which reproduces pain.  Skin: No suspicious lesions. No rashes.  Neurologic: Gait normal. Reflexes normal and symmetric. Sensation grossly WNL.  Psychiatric: Mentation appears normal. Normal affect.      Assessment and Plan:  Elevated BP  Rather than take medications, she would like 3 moths to wok on diet and exercise and follow up then.     Shoulder pain with cough  Monitor and follow up prn. This is a local soft tissue pain.     Follow-up: Return in about 3 months (around 3/17/2019). .        Scribe Disclosure:   I, Mckenna Plaza, am serving as a scribe to document services personally performed by Sudhakar Cee MD at this visit, based upon the provider's statements to me. All documentation has been reviewed by the aforementioned provider prior to being entered into the official medical record.     Portions of this medical record were completed by a scribe. UPON MY REVIEW AND AUTHENTICATION BY ELECTRONIC SIGNATURE, this confirms (a) I performed the applicable clinical services, and (b) the record is accurate.   Sudhakar Cee MD

## 2018-12-17 NOTE — NURSING NOTE
Chief Complaint   Patient presents with     Hypertension     Patient is here for hypertension follow up     Imm/Inj     Also here for 2nd dose twinrix vaccine     Back Pain     Also here for upper back pain when coughing       Mingo Dumont CMA (AAMA) at 10:32 AM on 12/17/2018

## 2018-12-17 NOTE — NURSING NOTE
Administered Twinrix vaccine (see Immunizations in Chart Review). Patient tolerated well.  Mingo Dumont CMA at 11:33 AM on 12/17/2018

## 2019-02-01 ENCOUNTER — MYC REFILL (OUTPATIENT)
Dept: INTERNAL MEDICINE | Facility: CLINIC | Age: 54
End: 2019-02-01

## 2019-02-01 DIAGNOSIS — F90.2 ADHD (ATTENTION DEFICIT HYPERACTIVITY DISORDER), COMBINED TYPE: ICD-10-CM

## 2019-02-04 NOTE — TELEPHONE ENCOUNTER
Last office visit 12/17/18. Last office visit with listed PCP 9/12/17. Upcoming visit 3/18/19 with Dr. Cee.    [facility] site reviewed 2/04/19. Last adderall refill received 12/04/18.    Will send to PCP for review.    Renae Sanchez RN

## 2019-02-05 RX ORDER — DEXTROAMPHETAMINE SACCHARATE, AMPHETAMINE ASPARTATE, DEXTROAMPHETAMINE SULFATE AND AMPHETAMINE SULFATE 7.5; 7.5; 7.5; 7.5 MG/1; MG/1; MG/1; MG/1
30 TABLET ORAL 2 TIMES DAILY
Qty: 60 TABLET | Refills: 0 | Status: SHIPPED | OUTPATIENT
Start: 2019-02-05 | End: 2019-03-26

## 2019-03-15 ENCOUNTER — ANCILLARY PROCEDURE (OUTPATIENT)
Dept: GENERAL RADIOLOGY | Facility: CLINIC | Age: 54
End: 2019-03-15
Attending: FAMILY MEDICINE
Payer: COMMERCIAL

## 2019-03-15 ENCOUNTER — OFFICE VISIT (OUTPATIENT)
Dept: URGENT CARE | Facility: URGENT CARE | Age: 54
End: 2019-03-15
Payer: COMMERCIAL

## 2019-03-15 VITALS
TEMPERATURE: 97.4 F | HEART RATE: 85 BPM | SYSTOLIC BLOOD PRESSURE: 110 MMHG | RESPIRATION RATE: 16 BRPM | OXYGEN SATURATION: 96 % | DIASTOLIC BLOOD PRESSURE: 68 MMHG

## 2019-03-15 DIAGNOSIS — S22.32XA CLOSED FRACTURE OF ONE RIB OF LEFT SIDE, INITIAL ENCOUNTER: ICD-10-CM

## 2019-03-15 DIAGNOSIS — S20.212A CONTUSION OF RIB ON LEFT SIDE, INITIAL ENCOUNTER: ICD-10-CM
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
## 2019-03-15 DIAGNOSIS — R07.81 RIB PAIN ON LEFT SIDE: Primary | ICD-10-CM

## 2019-03-15 PROCEDURE — 99214 OFFICE O/P EST MOD 30 MIN: CPT | Performed by: FAMILY MEDICINE

## 2019-03-15 PROCEDURE — 71101 X-RAY EXAM UNILAT RIBS/CHEST: CPT | Mod: LT

## 2019-03-15 NOTE — PROGRESS NOTES
Chief Complaint   Patient presents with     Urgent Care     Fell 2 weeks ago and injured right rib     SUBJECTIVE:  Chief Complaint   Patient presents with     Urgent Care     Fell 2 weeks ago and injured right rib     Unique Garrison is a 53 year old female with history of ADD, anxiety, depression, insomnia, tendinitis presents with a chief complaint of left-sided lower rib pain anteriorly and on the side for last 2 weeks.  Patient fell in her own apartment and her left chest hit against the chair.  Since then she has been noticing pain but has not been doing anything strong for controlling the pain.  Yesterday while she was rolling over the bed she felt like something was moving and hence is here today for a checkup.  She denies any shortness of breath or any other acute symptoms has been otherwise healthy.  left lower rib    The injury occurred 2 week(s) ago.   The injury happened while at home.  Pain exacerbated by breathing and movement .  Relieved by rest.  She treated it initially with no therapy. This is the first time this type of injury has occurred to this patient.     Past Medical History:   Diagnosis Date     ADD (attention deficit disorder)      Amblyopia     left eye     Anxiety      Depression     Resolved     Insomnia      Perimenopausal      Tendonitis, bicipital 5/4/2012    left     Uterine fibroid      Current Outpatient Medications   Medication Sig Dispense Refill     amphetamine-dextroamphetamine (ADDERALL) 30 MG tablet Take 1 tablet (30 mg) by mouth 2 times daily 60 tablet 0     B Complex CAPS        Blood Pressure Monitoring (BLOOD PRESSURE KIT) KIT Check home blood pressure daily and keep list for MD visits 1 kit 0     LevOCARNitine (CARNITINE PO) Take L-Carnitine       MAGNESIUM PO        PARoxetine (PAXIL) 40 MG tablet Take 1 tablet (40 mg) by mouth every morning 30 tablet 3     PROGESTERONE 100MG/G CREAM Apply 0.5 g topically 2 times daily. 30 g 6     VITAMIN D, CHOLECALCIFEROL, PO Take by  mouth daily       Social History     Tobacco Use     Smoking status: Former Smoker     Packs/day: 0.50     Years: 30.00     Pack years: 15.00     Types: Cigarettes     Last attempt to quit: 2013     Years since quittin.7     Smokeless tobacco: Never Used     Tobacco comment: stopped 6 months ago   Substance Use Topics     Alcohol use: Yes     Comment: 5-7 beers/week       ROS:  10 point ROS of systems including Constitutional, Eyes, Respiratory, Cardiovascular, Gastroenterology, Genitourinary, Integumentary, Psychiatric were all negative except for pertinent positives noted in my HPI           EXAM:   /68   Pulse 85   Temp 97.4  F (36.3  C) (Tympanic)   Resp 16   LMP 2016   SpO2 96%   Gen: healthy,alert,no distress  Extremity: left chest  has point tenderness along the lower rib area .   There is not compromise to the distal circulation.  Pulses are +2 and CRT is brisk  GENERAL APPEARANCE: healthy, alert and no distress  EXTREMITIES: peripheral pulses normal  SKIN: no suspicious lesions or rashes  NEURO: Normal strength and tone, sensory exam grossly normal, mentation intact and speech normal    X-RAY was done.  Showed rib fracture in the right lower rib area  ASSESSMENT:   contusion, sprain/strain and fracture of left lower rib   Unique was seen today for urgent care.    Diagnoses and all orders for this visit:    Rib pain on left side  -     XR Ribs & Chest Lt 3v    Fall against object    Contusion of rib on left side, initial encounter  -     XR Ribs & Chest Lt 3v    Closed fracture of one rib of left side, initial encounter        PLAN:  Reviewed results with patient   Advised patient that if notices any worsening chest pain shortness of breath or breathing should follow-up for further evaluation and treatment  1) Rest, Ice, Compress, Elevate and Ibuprofen q 6 hrs for 3-5 days  Also discussed the pain can last up to 6 weeks    Lidia Soares MD

## 2019-03-26 ENCOUNTER — MYC REFILL (OUTPATIENT)
Dept: INTERNAL MEDICINE | Facility: CLINIC | Age: 54
End: 2019-03-26

## 2019-03-26 DIAGNOSIS — F90.2 ADHD (ATTENTION DEFICIT HYPERACTIVITY DISORDER), COMBINED TYPE: ICD-10-CM

## 2019-03-26 RX ORDER — DEXTROAMPHETAMINE SACCHARATE, AMPHETAMINE ASPARTATE, DEXTROAMPHETAMINE SULFATE AND AMPHETAMINE SULFATE 7.5; 7.5; 7.5; 7.5 MG/1; MG/1; MG/1; MG/1
30 TABLET ORAL 2 TIMES DAILY
Qty: 60 TABLET | Refills: 0 | Status: SHIPPED | OUTPATIENT
Start: 2019-03-26 | End: 2019-05-16

## 2019-05-16 ENCOUNTER — MYC REFILL (OUTPATIENT)
Dept: INTERNAL MEDICINE | Facility: CLINIC | Age: 54
End: 2019-05-16

## 2019-05-16 DIAGNOSIS — F90.2 ADHD (ATTENTION DEFICIT HYPERACTIVITY DISORDER), COMBINED TYPE: ICD-10-CM

## 2019-05-16 RX ORDER — DEXTROAMPHETAMINE SACCHARATE, AMPHETAMINE ASPARTATE, DEXTROAMPHETAMINE SULFATE AND AMPHETAMINE SULFATE 7.5; 7.5; 7.5; 7.5 MG/1; MG/1; MG/1; MG/1
30 TABLET ORAL 2 TIMES DAILY
Qty: 60 TABLET | Refills: 0 | Status: SHIPPED | OUTPATIENT
Start: 2019-05-16 | End: 2019-07-09

## 2019-05-16 NOTE — TELEPHONE ENCOUNTER
RX for Adderall brought to the [facility] pharmacy.  My chart message sent to patient notifying them of script.    ANTHONY CHARLTON at 8:01 AM on 5/17/2019.          Reason For Call:        Chief Complaint   Patient presents with     Refill Request                 Medication Name, Dose and Monthly Quantity:   amphetamine-dextroamphetamine (ADDERALL) 30 MG tablet    Diagnosis requiring opiates:   ADHD     Problem List Updated:   Yes     Opioid Agreement On File - [facility] PAIN CONTRACT ID# [id number]:       Last Urine Drug Screen (at least once every 12 months) Date:     Unexpected Results:        [place]  Data Reviewed (at least once every 3 months) Date:   05/16/19  Unexpected Results:         Last Fill Date:    03/27/2019    Next Fill Date:   05/16/19     Last Visit with PCP:   09/12/17-Bria  11/07/18-Jaye    Future Visits with PCP:    Nothing scheduled  Processing:   [facility] pharmacy    ANTHONY CHARLTON at 2:02 PM on 5/16/2019.

## 2019-05-17 ENCOUNTER — ALLIED HEALTH/NURSE VISIT (OUTPATIENT)
Dept: INTERNAL MEDICINE | Facility: CLINIC | Age: 54
End: 2019-05-17
Payer: COMMERCIAL

## 2019-05-17 DIAGNOSIS — Z23 NEED FOR VACCINATION: Primary | ICD-10-CM

## 2019-05-17 NOTE — NURSING NOTE
Unique Garrison comes into clinic today at the request of Bria Evans Ordering Provider for Twinrix Vaccine.      This service provided today was under the supervising provider of the day Dr Joyce, who was available if needed.    Treva Rockwell CMA

## 2019-07-09 ENCOUNTER — MYC REFILL (OUTPATIENT)
Dept: INTERNAL MEDICINE | Facility: CLINIC | Age: 54
End: 2019-07-09

## 2019-07-09 DIAGNOSIS — F90.2 ADHD (ATTENTION DEFICIT HYPERACTIVITY DISORDER), COMBINED TYPE: ICD-10-CM

## 2019-07-10 NOTE — TELEPHONE ENCOUNTER
Reason For Call:        Chief Complaint   Patient presents with     Refill Request                 Medication Name, Dose and Monthly Quantity:   amphetamine-dextroamphetamine (ADDERALL) 30 MG tablet    Diagnosis requiring opiates:   ADHD     Problem List Updated:   Yes     Opioid Agreement On File - [facility] PAIN CONTRACT ID# [id number]:       Last Urine Drug Screen (at least once every 12 months) Date:     Unexpected Results:        [place]  Data Reviewed (at least once every 3 months) Date:   07/10/2019  Unexpected Results:         Last Fill Date:   05/17/2019    Next Fill Date:   07/10/2019     Last Visit with PCP:   09/12/17-Bria  11/07/18-Jaye    Future Visits with PCP:    Nothing scheduled  Processing:   [facility] pharmacy    ANTHONY CHARLTON at 6:46 AM on 7/10/2019.

## 2019-07-11 RX ORDER — DEXTROAMPHETAMINE SACCHARATE, AMPHETAMINE ASPARTATE, DEXTROAMPHETAMINE SULFATE AND AMPHETAMINE SULFATE 7.5; 7.5; 7.5; 7.5 MG/1; MG/1; MG/1; MG/1
30 TABLET ORAL 2 TIMES DAILY
Qty: 60 TABLET | Refills: 0 | Status: SHIPPED | OUTPATIENT
Start: 2019-07-11 | End: 2019-09-03

## 2019-07-15 ENCOUNTER — TELEPHONE (OUTPATIENT)
Dept: INTERNAL MEDICINE | Facility: CLINIC | Age: 54
End: 2019-07-15

## 2019-07-15 NOTE — TELEPHONE ENCOUNTER
[facility] Call Center    Phone Message    May a detailed message be left on voicemail: yes    Reason for Call: Medication Question or concern regarding medication   Prescription Clarification  Name of Medication: amphetamine-dextroamphetamine (ADDERALL) 30 MG tablet     Prescribing Provider: John Evans   Pharmacy: [facility]   What on the order needs clarification? Pt is wondering why this medication has not been sent to the pharmacy when it seems to have been approved by john Evans. Pt has been out for several days and needs this medication          Action Taken: Message routed to:  [facility]

## 2019-09-03 ENCOUNTER — MYC REFILL (OUTPATIENT)
Dept: INTERNAL MEDICINE | Facility: CLINIC | Age: 54
End: 2019-09-03

## 2019-09-03 DIAGNOSIS — F90.2 ADHD (ATTENTION DEFICIT HYPERACTIVITY DISORDER), COMBINED TYPE: ICD-10-CM

## 2019-09-05 NOTE — TELEPHONE ENCOUNTER
Reason For Call:        Chief Complaint   Patient presents with     Refill Request                 Medication Name, Dose and Monthly Quantity:   amphetamine-dextroamphetamine (ADDERALL) 30 MG tablet    Diagnosis requiring opiates:   ADHD     Problem List Updated:   Yes     Opioid Agreement On File - [facility] PAIN CONTRACT ID# [id number]:       Last Urine Drug Screen (at least once every 12 months) Date:     [place] Results:        [place]  Data Reviewed (at least once every 3 months) Date:   09/05/2019  Unexpected Results:         Last Fill Date:   07/16/2019  Next Fill Date:   09/05/2019   Last Visit with PCP:   09/12/17-Bria  11/07/18-Jaye    Future Visits with PCP:   Nothing scheduled  Processing:   [facility] pharmacy    ANTHONY CHARLTON CMA at 7:42 AM on 9/5/2019.           TACHYCARDIC

## 2019-09-06 RX ORDER — DEXTROAMPHETAMINE SACCHARATE, AMPHETAMINE ASPARTATE, DEXTROAMPHETAMINE SULFATE AND AMPHETAMINE SULFATE 7.5; 7.5; 7.5; 7.5 MG/1; MG/1; MG/1; MG/1
30 TABLET ORAL 2 TIMES DAILY
Qty: 60 TABLET | Refills: 0 | Status: SHIPPED | OUTPATIENT
Start: 2019-09-06 | End: 2019-10-21

## 2019-10-13 ENCOUNTER — HOSPITAL ENCOUNTER (EMERGENCY)
Facility: CLINIC | Age: 54
Discharge: HOME | End: 2019-10-13
Attending: EMERGENCY MEDICINE | Admitting: EMERGENCY MEDICINE
Payer: COMMERCIAL

## 2019-10-13 VITALS
OXYGEN SATURATION: 99 % | TEMPERATURE: 97.9 F | RESPIRATION RATE: 18 BRPM | WEIGHT: 243.8 LBS | DIASTOLIC BLOOD PRESSURE: 85 MMHG | SYSTOLIC BLOOD PRESSURE: 160 MMHG | BODY MASS INDEX: 37.62 KG/M2 | HEART RATE: 98 BPM

## 2019-10-13 DIAGNOSIS — H11.31 SUBCONJUNCTIVAL HEMORRHAGE OF RIGHT EYE: ICD-10-CM

## 2019-10-13 PROCEDURE — 99282 EMERGENCY DEPT VISIT SF MDM: CPT | Performed by: EMERGENCY MEDICINE

## 2019-10-13 PROCEDURE — 99284 EMERGENCY DEPT VISIT MOD MDM: CPT | Mod: Z6 | Performed by: EMERGENCY MEDICINE

## 2019-10-13 RX ORDER — ASPIRIN 81 MG/1
81 TABLET, CHEWABLE ORAL DAILY
COMMUNITY

## 2019-10-13 RX ORDER — CHOLECALCIFEROL (VITAMIN D3) 125 MCG
CAPSULE ORAL
COMMUNITY

## 2019-10-13 RX ORDER — ERYTHROMYCIN 5 MG/G
0.5 OINTMENT OPHTHALMIC 3 TIMES DAILY
Qty: 3.5 G | Refills: 0 | Status: SHIPPED | OUTPATIENT
Start: 2019-10-13 | End: 2019-10-13

## 2019-10-13 RX ORDER — ERYTHROMYCIN 5 MG/G
0.5 OINTMENT OPHTHALMIC 3 TIMES DAILY
Qty: 3.5 G | Refills: 0 | Status: SHIPPED | OUTPATIENT
Start: 2019-10-13

## 2019-10-13 ASSESSMENT — ENCOUNTER SYMPTOMS
EYE REDNESS: 1
PHOTOPHOBIA: 0

## 2019-10-13 NOTE — ED AVS SNAPSHOT
[facility], [place], Emergency Department  [address]  [place] [address]  Phone:  [phone]  Fax:  [phone]                                    Unique Garrison   MRN: [id number]    Department:  [facility], Emergency Department   Date of Visit:  10/13/2019           After Visit Summary Signature Page    I have received my discharge instructions, and my questions have been answered. I have discussed any challenges I see with this plan with the nurse or doctor.    ..........................................................................................................................................  Patient/Patient Representative Signature      ..........................................................................................................................................  Patient Representative Print Name and Relationship to Patient    ..................................................               ................................................  Date                                   Time    ..........................................................................................................................................  Reviewed by Signature/Title    ...................................................              ..............................................  Date                                               Time          22EPIC Rev 08/18

## 2019-10-13 NOTE — DISCHARGE INSTRUCTIONS
Apply erythromycin ointment to your right eye 3 times a day.     Follow up with [facility] (phone: [phone]) this week.  They will call you on Monday to schedule an appointment.  If you do not hear from them by Monday afternoon, please call their clinic.     Please call the eye doctor on call for worsening/vision changes.  Call [phone] and ask for the eye doctor on call.

## 2019-10-13 NOTE — ED TRIAGE NOTES
Pt had a cardboard box hit her R eye yesterday, 10/12/19 AM. Pt has had discharge and bleeding from eye.  Pt has had Lasix surgeries on bilateral eyes- 5 to 6 years ago.

## 2019-10-13 NOTE — ED PROVIDER NOTES
History     Chief Complaint   Patient presents with     Eye Injury     Pt had a cardboard box hit her R eye yesterday, 10/12/19 AM. Pt has had discharge and bleeding from eye.     HPI  Unique Garrison is a 54 year old female who was referred to the ED by the [facility] eye doctor. She says she accidentally bumped cardboard into her eye yesterday and her eye actually bled.  She says her eye as red.  Today she thought about it and was worried it could get infected so called the eye doctor on call.  She says her tdap is utd.  No vision changes.     I have reviewed the Medications, Allergies, Past Medical and Surgical History, and Social History in the Epic system.    Review of Systems   Eyes: Positive for redness. Negative for photophobia and visual disturbance. Eye discharge: bleeding yesterday.  not today.    All other systems reviewed and are negative.      Physical Exam   BP: (!) 160/85  Pulse: 98  Heart Rate: 96  Temp: 97.9  F (36.6  C)  Resp: 18  Weight: 110.6 kg (243 lb 12.8 oz)  SpO2: 99 %      Physical Exam  Vitals signs and nursing note reviewed.   HENT:      Head: Normocephalic.   Eyes:      Comments: Conjunctival hemorrhage to right eye.    Skin:     General: Skin is warm and dry.   Neurological:      General: No focal deficit present.      Mental Status: She is alert and oriented to person, place, and time.   Psychiatric:         Mood and Affect: Mood normal.         Behavior: Behavior normal.         ED Course        Procedures           Labs Ordered and Resulted from Time of ED Arrival Up to the Time of Departure from the ED - No data to display         Assessments & Plan (with Medical Decision Making)   The patient was referred to the ED by the eye resident on call due to stating she struck her right eye with cardboard yesterday and it bled.  They were worried about a globe rupture so wanted her to come to the ED.  The eye resident was called when the patient arrived and was here promptly.  They did an eye  exam and see some small particles under the conjunctive but no tear or bleeding.  They wanted her to go home with erythromycin ointment tid and to f/u in their clinic later this week for recheck to see if they eye will push out these foreign bodies or if she will need them removed in clinic.  She was discharge home.  VA was 20/20 right eye and 20/40 left eye.     I have reviewed the nursing notes.    I have reviewed the findings, diagnosis, plan and need for follow up with the patient.    New Prescriptions    ERYTHROMYCIN (ROMYCIN) 5 MG/GM OPHTHALMIC OINTMENT    Place 0.5 inches into the right eye 3 times daily for 7 days       Final diagnoses:   Subconjunctival hemorrhage of right eye       10/13/2019   [facility], [place], EMERGENCY DEPARTMENT     Irma Peterson MD  10/13/19 [id number]

## 2019-10-14 NOTE — CONSULTS
OPHTHALMOLOGY CONSULT NOTE  10/13/19    Patient: Unique Garrison  Reason for Consult: Trauma to right eye    HISTORY OF PRESENTING ILLNESS:     Unique Garrison is a 54 year old female who presented after injury to right eye from cardboard a day prior to ED visit. Patient initially called the ophthalmology triage line to say that she had cardboard hit her right eye and there was significant bleeding (blood dripped down her cheek and went down to her collar). I asked patient to come to ED to rule out open globe. Patient denied any new double vision, eye pain, flashes, floaters. She thinks her vision might be slightly blurry and there is mild irritation in the right eye. The bleeding stopped soon after and she denies any lid laceration. Patient has many homework assignments due by midnight which is why she was initially hesitant to come to ED.           Review of systems were otherwise negative except for that which has been stated above.      OCULAR/MEDICAL/SURGICAL HISTORIES:     Past Ocular History:  High myopia. Had lasik 5-6 years ago bilaterally    Past Medical History:   Diagnosis Date     ADD (attention deficit disorder)      Amblyopia     left eye     Anxiety      Depression     Resolved     Insomnia      Perimenopausal      Tendonitis, bicipital 5/4/2012    left     Uterine fibroid        Past Surgical History:   Procedure Laterality Date     ARTHROSCOPY SHOULDER ROTATOR CUFF REPAIR Right 2014     C ORAL SURGERY SINGLE TOOTH       gum grafting       LASIK CUSTOMVUE BILATERAL  1/17/2014    Procedure: LASIK CUSTOMVUE BILATERAL;  BILATERAL CUSTOMVUE LASIK WITH INTRALASE ;  Surgeon: Juve Hill MD;  Location: [facility]     TONSILLECTOMY       WAVESCAN SCREENING  1/13/2014    Procedure: WAVESCAN SCREENING;  BILATERAL WAVESCAN ;  Surgeon: Juve Hill MD;  Location: [facility]       EXAMINATION:     Base Eye Exam     Visual Acuity       Right Left    Near cc 20/25 20/30          Tonometry (Tonopen,  3:18 PM)       Right Left    Pressure 11 9          Pupils       Pupils APD    Right PERRL None    Left PERRL None          Visual Fields       Left Right     Full Full          Extraocular Movement       Right Left     Full Full          Neuro/Psych     Oriented x3:  Yes    Mood/Affect:  Normal            Slit Lamp and Fundus Exam     External Exam       Right Left    External Normal Normal          Slit Lamp Exam       Right Left    Lids/Lashes Normal Normal    Conjunctiva/Sclera large sub conj hemorhage temporally; some debris lodged under the conj just temporal to the cornea but no conj laceration appreciated on exam White and quiet    Cornea LASIK flap in place LASIK flap in place, clear    Anterior Chamber Deep and quiet Deep and quiet    Iris Round and reactive Round and reactive    Lens Clear Clear                Labs/Studies/Imaging Performed  none     ASSESSMENT/PLAN:     Unique Garrison is a 54 year old female who presents after R eye trauma  #Large sub conj hemorrhage of Right eye- located temporally  # Some debris lodged under the conj but above the sclera temporal to the cornea  - no conj laceration seen on exam but conj likely epithelialized over the debris after the injury  - VA is 20/25 in Right eye and pressure is 11  - no scleral laceration appreciated on exam  - Pt denies pain  - Pt declined dilated exam today as she had assignments to finish before midnight  - debris lodged under the conj is too deep to be taken out with tweezers in the ED  - Asked patient to follow up in clinic this week and to call back sooner if symptoms worsen  - Start erythromycin ointment in R eye TID    It is our pleasure to participate in this patient's care and treatment. Please contact us with any further questions or concerns.     Discussed with Dr. Gonzalez Hugo MD  PGY2, Ophthalmology

## 2019-10-16 ENCOUNTER — TELEPHONE (OUTPATIENT)
Dept: OPHTHALMOLOGY | Facility: CLINIC | Age: 54
End: 2019-10-16

## 2019-10-16 NOTE — TELEPHONE ENCOUNTER
I scheduled the follow up appointment for patient for next week at the patient's request.  She is very busy this week.  The patient notes she is doing well and not having eye pain.

## 2019-10-17 ENCOUNTER — OFFICE VISIT (OUTPATIENT)
Dept: OPHTHALMOLOGY | Facility: CLINIC | Age: 54
End: 2019-10-17
Payer: COMMERCIAL

## 2019-10-17 DIAGNOSIS — S05.01XD: Primary | ICD-10-CM

## 2019-10-17 DIAGNOSIS — H11.31 SUBCONJUNCTIVAL HEMORRHAGE OF RIGHT EYE: ICD-10-CM

## 2019-10-17 ASSESSMENT — SLIT LAMP EXAM - LIDS
COMMENTS: NORMAL
COMMENTS: NORMAL

## 2019-10-17 ASSESSMENT — EXTERNAL EXAM - LEFT EYE: OS_EXAM: NORMAL

## 2019-10-17 ASSESSMENT — TONOMETRY
OS_IOP_MMHG: 9
OD_IOP_MMHG: 9
IOP_METHOD: ICARE

## 2019-10-17 ASSESSMENT — VISUAL ACUITY
METHOD: SNELLEN - LINEAR
OD_SC: 20/20
OS_SC+: -2
OS_SC: 20/30

## 2019-10-17 ASSESSMENT — CONF VISUAL FIELD
METHOD: COUNTING FINGERS
OS_NORMAL: 1
OD_NORMAL: 1

## 2019-10-17 ASSESSMENT — EXTERNAL EXAM - RIGHT EYE: OD_EXAM: NORMAL

## 2019-10-17 NOTE — PROGRESS NOTES
History  HPI     Follow Up     In right eye.  Onset was gradual.  This started days ago.  Occurring constantly.  It is worse throughout the day.  Since onset it is gradually improving.  Associated symptoms include redness.  Treatments tried include ointment.  Pain was noted as 0/10. Additional comments: F/u from ED visit. Eye Injury, Pt had a cardboard box hit her Right eye, 10/12/19 AM. Pt has had discharge and bleeding from eye.                  Comments     Red eye has gotten much better since getting hit with cardboard 10/12/19. No eye pain or irritation today. No vision changes. Given pilar and using at night    Brittny Rey COT 8:22 AM October 17, 2019             Last edited by Brittny Rey on 10/17/2019  8:22 AM. (History)          Assessment/Plan  (S05.01XD) Injury, conjunctiva, superficial, right, subsequent encounter  (primary encounter diagnosis)  Comment: Subconjunctival hemorrhage resolving, few cardboard particles remaining, no abrasion  Plan:  Educated patient on clinical findings. Continue use of erythromycin ointment at night until redness resolves. Referred to cornea department following resolution of hemorrhage and edema for debridement of area with cardboard particles.    (H11.31) Subconjunctival hemorrhage of right eye  Comment: See above      Complete documentation of historical and exam elements from today's encounter can  be found in the full encounter summary report (not reduplicated in this progress  note). I personally obtained the chief complaint(s) and history of present illness. I  confirmed and edited as necessary the review of systems, past medical/surgical  history, family history, social history, and examination findings as documented by  others; and I examined the patient myself. I personally reviewed the relevant tests,  images, and reports as documented above. I formulated and edited as necessary the  assessment and plan and discussed the findings and management plan with  the  patient and family.    Spencer Alvarez, DAVID, FAAO

## 2019-10-17 NOTE — NURSING NOTE
Chief Complaints and History of Present Illnesses   Patient presents with     Follow Up     F/u from ED visit. Eye Injury, Pt had a cardboard box hit her Right eye, 10/12/19 AM. Pt has had discharge and bleeding from eye.         Chief Complaint(s) and History of Present Illness(es)     Follow Up     Laterality: right eye    Onset: gradual    Onset: days ago    Frequency: constantly    Timing: throughout the day    Course: gradually improving    Associated symptoms: redness    Treatments tried: ointment    Pain scale: 0/10    Comments: F/u from ED visit. Eye Injury, Pt had a cardboard box hit her Right eye, 10/12/19 AM. Pt has had discharge and bleeding from eye.                  Comments     Red eye has gotten much better since getting hit with cardboard 10/12/19. No eye pain or irritation today. No vision changes. Given pilar and using at night    Brittny Rey COT 8:22 AM October 17, 2019

## 2019-10-21 ENCOUNTER — MYC REFILL (OUTPATIENT)
Dept: INTERNAL MEDICINE | Facility: CLINIC | Age: 54
End: 2019-10-21

## 2019-10-21 DIAGNOSIS — F90.2 ADHD (ATTENTION DEFICIT HYPERACTIVITY DISORDER), COMBINED TYPE: ICD-10-CM

## 2019-10-22 RX ORDER — DEXTROAMPHETAMINE SACCHARATE, AMPHETAMINE ASPARTATE, DEXTROAMPHETAMINE SULFATE AND AMPHETAMINE SULFATE 7.5; 7.5; 7.5; 7.5 MG/1; MG/1; MG/1; MG/1
30 TABLET ORAL 2 TIMES DAILY
Qty: 60 TABLET | Refills: 0 | Status: SHIPPED | OUTPATIENT
Start: 2019-10-22 | End: 2019-12-10

## 2019-10-22 NOTE — TELEPHONE ENCOUNTER
Reason For Call:        Chief Complaint   Patient presents with     Refill Request                 Medication Name, Dose and Monthly Quantity:   amphetamine-dextroamphetamine (ADDERALL) 30 MG tablet    Diagnosis requiring opiates:   ADHD     Problem List Updated:   Yes     Opioid Agreement On File - [facility] PAIN CONTRACT ID# [id number]:       Last Urine Drug Screen (at least once every 12 months) Date:     Unexpected Results:        [place]  Data Reviewed (at least once every 3 months) Date:   10/22/2019  Unexpected Results:         Last Fill Date:   09/07/2019  Next Fill Date:   10/22/2019  Last Visit with PCP:   09/12/17-Bria  11/07/18-Jaye    Future Visits with PCP:   Nothing scheduled  Processing:   [facility] [place] [facility] pharmacy    ANTHONY CHARLTON CMA at 7:27 AM on 10/22/2019.

## 2019-11-07 ENCOUNTER — HEALTH MAINTENANCE LETTER (OUTPATIENT)
Age: 54
End: 2019-11-07

## 2019-11-21 ENCOUNTER — OFFICE VISIT (OUTPATIENT)
Dept: OPHTHALMOLOGY | Facility: CLINIC | Age: 54
End: 2019-11-21
Attending: OPHTHALMOLOGY
Payer: COMMERCIAL

## 2019-11-21 DIAGNOSIS — H11.9 CONJUNCTIVAL LESION: ICD-10-CM

## 2019-11-21 DIAGNOSIS — T15.11XA FOREIGN BODY OF RIGHT CONJUNCTIVA, INITIAL ENCOUNTER: Primary | ICD-10-CM

## 2019-11-21 PROCEDURE — G0463 HOSPITAL OUTPT CLINIC VISIT: HCPCS | Mod: ZF

## 2019-11-21 ASSESSMENT — VISUAL ACUITY
OS_SC+: -1
OS_SC: 20/25
METHOD: SNELLEN - LINEAR
OD_SC: 20/20

## 2019-11-21 ASSESSMENT — SLIT LAMP EXAM - LIDS
COMMENTS: NORMAL
COMMENTS: NORMAL

## 2019-11-21 ASSESSMENT — EXTERNAL EXAM - RIGHT EYE: OD_EXAM: NORMAL

## 2019-11-21 ASSESSMENT — CONF VISUAL FIELD
OS_NORMAL: 1
METHOD: COUNTING FINGERS
OD_NORMAL: 1

## 2019-11-21 ASSESSMENT — TONOMETRY
IOP_METHOD: TONOPEN
OS_IOP_MMHG: 13
OD_IOP_MMHG: 17

## 2019-11-21 ASSESSMENT — EXTERNAL EXAM - LEFT EYE: OS_EXAM: NORMAL

## 2019-11-21 NOTE — PROGRESS NOTES
History: rred by Dr Alvarez for a cornea consultation. Suyapa says she had an ocular injury RE about a month ago. Dr Alvarez saw something in her RE during exam that he thought could be removed. She says she sometimes sees floaters but no new ones. She denies flashes, dryness or pain.   In 2014 she has LASIK here with this provider      GTTS:  Erythromycin TID OD      Assessment/Plan    1. Injury, conjunctiva, superficial, right  - Few cardboard or mettallic particles remaining, no abrasion. No epi defect, no inflammation - No tx at this time since unable to remove superficial particles without surgical incision. Pt will call with any redness, discomfort or change in VA.  - Continue emycin prn    2. Lesion right eye I  Plica.  - Slit lamp photos today.    2. Subconjunctival hemorrhage of right eye  - Resolved.    3. Lasik (4-6 yrs ago) - No flap truama  S/p 1/27/2014 LASIK Sx (done by Dr Hill)  - Doing well.      RTC in 1yr o rsooner if needed.    Juve Hill MD, PhD  Professor and Vice- of Clinical and Surgical Ophthalmology  Director of the Cornea Service  [facility]  Attending Physician Attestation:  Complete documentation of historical and exam elements from today's encounter can be found in the full encounter summary report (not reduplicated in this progress note).  I personally obtained the chief complaint(s) and history of present illness.  I confirmed and edited as necessary the review of systems, past medical/surgical history, family history, social history, and examination findings as documented by others; and I examined the patient myself.  I personally reviewed the relevant tests, images, and reports as documented above.  I formulated and edited as necessary the assessment and plan and discussed the findings and management plan with the patient and family. - Juve Hill MD

## 2019-11-21 NOTE — NURSING NOTE
No chief complaint on file.    Chief Complaint(s) and History of Present Illness(es)     Referred by Dr Alvarez for a cornea consultation. Suyapa says she had an ocular injury RE about a month ago. Dr Alvarez saw something in her RE during exam that he thought could be removed. She says she sometimes sees floaters but no new ones. She denies flashes, dryness or pain.     Mayito Laws COT 8:11 AM November 21, 2019           \

## 2019-12-10 ENCOUNTER — MYC REFILL (OUTPATIENT)
Dept: INTERNAL MEDICINE | Facility: CLINIC | Age: 54
End: 2019-12-10

## 2019-12-10 DIAGNOSIS — F90.2 ADHD (ATTENTION DEFICIT HYPERACTIVITY DISORDER), COMBINED TYPE: ICD-10-CM

## 2019-12-10 RX ORDER — DEXTROAMPHETAMINE SACCHARATE, AMPHETAMINE ASPARTATE, DEXTROAMPHETAMINE SULFATE AND AMPHETAMINE SULFATE 7.5; 7.5; 7.5; 7.5 MG/1; MG/1; MG/1; MG/1
30 TABLET ORAL 2 TIMES DAILY
Qty: 60 TABLET | Refills: 0 | Status: SHIPPED | OUTPATIENT
Start: 2019-12-10 | End: 2020-08-11

## 2019-12-10 NOTE — TELEPHONE ENCOUNTER
Reason For Call:        Chief Complaint   Patient presents with     Refill Request                 Medication Name, Dose and Monthly Quantity:   amphetamine-dextroamphetamine (ADDERALL) 30 MG tablet    Diagnosis requiring opiates:   ADHD     Problem List Updated:   Yes     Opioid Agreement On File - [facility] PAIN CONTRACT ID# [id number]:       Last Urine Drug Screen (at least once every 12 months) Date:     Unexpected Results:        [place]  Data Reviewed (at least once every 3 months) Date:   12/10/2019  Unexpected Results:         Last Fill Date:   10/22/2019  Next Fill Date:   12/10/2019  Last Visit with PCP:   09/12/17-Bria  11/07/18-Jaye    Future Visits with PCP:   Nothing scheduled-My chart message sent to be seen.      Processing:   [facility]    ANTHONY CHARLTON CMA at 1:32 PM on 12/10/2019.

## 2020-02-17 ENCOUNTER — HEALTH MAINTENANCE LETTER (OUTPATIENT)
Age: 55
End: 2020-02-17

## 2020-08-11 ENCOUNTER — VIRTUAL VISIT (OUTPATIENT)
Dept: INTERNAL MEDICINE | Facility: CLINIC | Age: 55
End: 2020-08-11
Payer: COMMERCIAL

## 2020-08-11 DIAGNOSIS — Z13.1 SCREENING FOR DIABETES MELLITUS: ICD-10-CM

## 2020-08-11 DIAGNOSIS — F90.2 ADHD (ATTENTION DEFICIT HYPERACTIVITY DISORDER), COMBINED TYPE: ICD-10-CM

## 2020-08-11 DIAGNOSIS — Z11.59 ENCOUNTER FOR HEPATITIS C SCREENING TEST FOR LOW RISK PATIENT: ICD-10-CM

## 2020-08-11 DIAGNOSIS — Z12.11 SCREENING FOR COLON CANCER: ICD-10-CM

## 2020-08-11 DIAGNOSIS — Z12.31 ENCOUNTER FOR SCREENING MAMMOGRAM FOR BREAST CANCER: Primary | ICD-10-CM

## 2020-08-11 DIAGNOSIS — Z13.0 SCREENING FOR DEFICIENCY ANEMIA: ICD-10-CM

## 2020-08-11 DIAGNOSIS — Z13.220 SCREENING FOR HYPERLIPIDEMIA: ICD-10-CM

## 2020-08-11 RX ORDER — DEXTROAMPHETAMINE SACCHARATE, AMPHETAMINE ASPARTATE, DEXTROAMPHETAMINE SULFATE AND AMPHETAMINE SULFATE 7.5; 7.5; 7.5; 7.5 MG/1; MG/1; MG/1; MG/1
30 TABLET ORAL 2 TIMES DAILY
Qty: 60 TABLET | Refills: 0 | Status: SHIPPED | OUTPATIENT
Start: 2020-08-11 | End: 2020-09-03

## 2020-08-11 ASSESSMENT — PATIENT HEALTH QUESTIONNAIRE - PHQ9
SUM OF ALL RESPONSES TO PHQ QUESTIONS 1-9: 16
10. IF YOU CHECKED OFF ANY PROBLEMS, HOW DIFFICULT HAVE THESE PROBLEMS MADE IT FOR YOU TO DO YOUR WORK, TAKE CARE OF THINGS AT HOME, OR GET ALONG WITH OTHER PEOPLE: EXTREMELY DIFFICULT
SUM OF ALL RESPONSES TO PHQ QUESTIONS 1-9: 16

## 2020-08-11 NOTE — NURSING NOTE
Chief Complaint   Patient presents with     Recheck Medication     Pt would like to recheck medications      Kiara Eng, EMT at 8:47 AM sign on 8/11/2020

## 2020-08-11 NOTE — PROGRESS NOTES
"Video Visit Technology for this patient: Abigail not working, patient has smart device, please try [employer] Video with patient [phone]     Unique Garrison is a 55 year old female who is being evaluated via a billable video visit.      The patient has been notified of following:     \"This video visit will be conducted via a call between you and your physician/provider. We have found that certain health care needs can be provided without the need for an in-person physical exam.  This service lets us provide the care you need with a video conversation.  If a prescription is necessary we can send it directly to your pharmacy.  If lab work is needed we can place an order for that and you can then stop by our lab to have the test done at a later time.    Video visits are billed at different rates depending on your insurance coverage.  Please reach out to your insurance provider with any questions.    If during the course of the call the physician/provider feels a video visit is not appropriate, you will not be charged for this service.\"    Patient has given verbal consent for Video visit? Yes  How would you like to obtain your AVS? MyChart  If you are dropped from the video visit, the video invite should be resent to: Text to cell phone: [phone]   Will anyone else be joining your video visit? No      Subjective     Unique Garrison is a 55 year old female who presents today via video visit for the following health issues:    HPI  Patient Active Problem List   Diagnosis     ADHD (attention deficit hyperactivity disorder), diagnosed 2010     Adjustment disorder with depressed mood     Obesity     Calcaneal spur, left     Pain in joint, shoulder region     Medication refill- do not delete      Cobblestone retinal degeneration     Myopia     Occlusion amblyopia     Stress reaction of bone     Suyapa has a diagnosis of ADHD and has been treated with Adderall in the past but stopped taking it approx Feb/March 2020.  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  She is pulled in several directions at once and feels it is difficult to finish one piece of work before having to start another and has decided she might benefit by being back on her Adderall. She is otherwise feeling healthy.     She has no one to take her to a colonoscopy so agrees to perform a FIT test for colon-rectal cancer screening.   She purchased a portable BP cuff but has not checked her BPs lately.  She has an appt scheduled with me on 9/3/2020.    Reviewed and updated as needed this visit by Provider       ROS:   as above      Physical Exam     GENERAL: Healthy, alert and no distress  RESP: No audible wheeze, cough, or visible cyanosis.  No visible retractions or increased work of breathing.    PSYCH: Mentation appears normal, affect normal/bright, judgement and insight intact, normal speech and appearance well-groomed.        Unique Garrison was seen today for recheck medication.    Diagnoses and all orders for this visit:    Encounter for screening mammogram for breast cancer  -     Mammogram, routine screening; Future    ADHD (attention deficit hyperactivity disorder), combined type  -     amphetamine-dextroamphetamine (ADDERALL) 30 MG tablet; Take 1 tablet (30 mg) by mouth 2 times daily    HCM  She will check with her insurance regarding Shingrix vaccine and consider getting this at [facility] pharmacy or on 9/3/2020.  Labs ordered for same day as Mammo  She will also bring with her on 9/3/2020 some documented BPs from home after resting 5 minutes.     Video-Visit Details    Type of service:  Video Visit    Video time 20 minutes    Originating Location (pt. Location): Home    Distant Location (provider location):  [facility]     Platform used for Video Visit: [facility]    No follow-ups on file.       RICKY Wasserman CNP

## 2020-08-12 ASSESSMENT — PATIENT HEALTH QUESTIONNAIRE - PHQ9: SUM OF ALL RESPONSES TO PHQ QUESTIONS 1-9: 16

## 2020-08-12 NOTE — PATIENT INSTRUCTIONS
[facility] Medication Refill Request Information:  * Please contact your pharmacy regarding ANY request for medication refills.  ** [facility] Prescription Fax = [phone]  * Please allow 3 business days for routine medication refills.  * Please allow 5 business days for controlled substance medication refills.     [facility] Test Result notification information:  *You will be notified with in 7-10 days of your appointment day regarding the results of your test.  If you are on MyChart you will be notified as soon as the provider has reviewed the results and signed off on them.    [facility]: [phone]       Mammogram  [phone]

## 2020-09-02 ASSESSMENT — ENCOUNTER SYMPTOMS
NERVOUS/ANXIOUS: 1
SYNCOPE: 0
MYALGIAS: 1
ORTHOPNEA: 0
INCREASED ENERGY: 1
FEVER: 0
DECREASED CONCENTRATION: 1
CHILLS: 1
NECK PAIN: 0
ARTHRALGIAS: 0
MUSCLE CRAMPS: 1
INSOMNIA: 1
HALLUCINATIONS: 0
HYPERTENSION: 0
PANIC: 0
EXERCISE INTOLERANCE: 0
FATIGUE: 1
DEPRESSION: 1
NIGHT SWEATS: 0
STIFFNESS: 0
LEG PAIN: 0
ALTERED TEMPERATURE REGULATION: 1
POLYDIPSIA: 0
JOINT SWELLING: 1
WEIGHT GAIN: 0
BACK PAIN: 0
SLEEP DISTURBANCES DUE TO BREATHING: 0
PALPITATIONS: 0
MUSCLE WEAKNESS: 0
SKIN CHANGES: 0
WEIGHT LOSS: 1
LIGHT-HEADEDNESS: 0
POOR WOUND HEALING: 0
NAIL CHANGES: 0
POLYPHAGIA: 0
HYPOTENSION: 0
DECREASED APPETITE: 1

## 2020-09-03 ENCOUNTER — OFFICE VISIT (OUTPATIENT)
Dept: INTERNAL MEDICINE | Facility: CLINIC | Age: 55
End: 2020-09-03
Payer: COMMERCIAL

## 2020-09-03 VITALS
WEIGHT: 246 LBS | SYSTOLIC BLOOD PRESSURE: 150 MMHG | DIASTOLIC BLOOD PRESSURE: 86 MMHG | HEART RATE: 94 BPM | HEIGHT: 67 IN | BODY MASS INDEX: 38.61 KG/M2 | OXYGEN SATURATION: 98 %

## 2020-09-03 DIAGNOSIS — Z13.220 SCREENING FOR HYPERLIPIDEMIA: ICD-10-CM

## 2020-09-03 DIAGNOSIS — Z13.0 SCREENING FOR DEFICIENCY ANEMIA: ICD-10-CM

## 2020-09-03 DIAGNOSIS — R63.4 WEIGHT LOSS: ICD-10-CM

## 2020-09-03 DIAGNOSIS — F43.21 ADJUSTMENT DISORDER WITH DEPRESSED MOOD: ICD-10-CM

## 2020-09-03 DIAGNOSIS — F90.2 ADHD (ATTENTION DEFICIT HYPERACTIVITY DISORDER), COMBINED TYPE: ICD-10-CM

## 2020-09-03 DIAGNOSIS — Z12.31 ENCOUNTER FOR SCREENING MAMMOGRAM FOR BREAST CANCER: ICD-10-CM

## 2020-09-03 DIAGNOSIS — Z11.59 ENCOUNTER FOR HEPATITIS C SCREENING TEST FOR LOW RISK PATIENT: ICD-10-CM

## 2020-09-03 DIAGNOSIS — I10 ESSENTIAL HYPERTENSION: ICD-10-CM

## 2020-09-03 DIAGNOSIS — Z12.4 SCREENING FOR CERVICAL CANCER: Primary | ICD-10-CM

## 2020-09-03 DIAGNOSIS — Z13.1 SCREENING FOR DIABETES MELLITUS: ICD-10-CM

## 2020-09-03 LAB
ANION GAP SERPL CALCULATED.3IONS-SCNC: 6 MMOL/L (ref 3–14)
BUN SERPL-MCNC: 8 MG/DL (ref 7–30)
CALCIUM SERPL-MCNC: 9.3 MG/DL (ref 8.5–10.1)
CHLORIDE SERPL-SCNC: 106 MMOL/L (ref 94–109)
CHOLEST SERPL-MCNC: 243 MG/DL
CO2 SERPL-SCNC: 25 MMOL/L (ref 20–32)
CREAT SERPL-MCNC: 0.74 MG/DL (ref 0.52–1.04)
ERYTHROCYTE [DISTWIDTH] IN BLOOD BY AUTOMATED COUNT: 12.3 % (ref 10–15)
ERYTHROCYTE [SEDIMENTATION RATE] IN BLOOD BY WESTERGREN METHOD: 12 MM/H (ref 0–30)
GFR SERPL CREATININE-BSD FRML MDRD: >90 ML/MIN/{1.73_M2}
GLUCOSE SERPL-MCNC: 111 MG/DL (ref 70–99)
HBA1C MFR BLD: 5.8 % (ref 0–5.6)
HCT VFR BLD AUTO: 46.3 % (ref 35–47)
HCV AB SERPL QL IA: NONREACTIVE
HDLC SERPL-MCNC: 55 MG/DL
HGB BLD-MCNC: 15.1 G/DL (ref 11.7–15.7)
LDLC SERPL CALC-MCNC: 163 MG/DL
MCH RBC QN AUTO: 31 PG (ref 26.5–33)
MCHC RBC AUTO-ENTMCNC: 32.6 G/DL (ref 31.5–36.5)
MCV RBC AUTO: 95 FL (ref 78–100)
NONHDLC SERPL-MCNC: 187 MG/DL
PLATELET # BLD AUTO: 311 10E9/L (ref 150–450)
POTASSIUM SERPL-SCNC: 3.9 MMOL/L (ref 3.4–5.3)
RBC # BLD AUTO: 4.87 10E12/L (ref 3.8–5.2)
SODIUM SERPL-SCNC: 138 MMOL/L (ref 133–144)
TRIGL SERPL-MCNC: 122 MG/DL
TSH SERPL DL<=0.005 MIU/L-ACNC: 2.3 MU/L (ref 0.4–4)
WBC # BLD AUTO: 5.9 10E9/L (ref 4–11)

## 2020-09-03 RX ORDER — PAROXETINE 40 MG/1
40 TABLET, FILM COATED ORAL EVERY MORNING
Qty: 90 TABLET | Refills: 3 | Status: SHIPPED | OUTPATIENT
Start: 2020-09-03

## 2020-09-03 RX ORDER — DEXTROAMPHETAMINE SACCHARATE, AMPHETAMINE ASPARTATE, DEXTROAMPHETAMINE SULFATE AND AMPHETAMINE SULFATE 7.5; 7.5; 7.5; 7.5 MG/1; MG/1; MG/1; MG/1
30 TABLET ORAL 2 TIMES DAILY
Qty: 60 TABLET | Refills: 0 | Status: SHIPPED | OUTPATIENT
Start: 2020-09-10

## 2020-09-03 RX ORDER — LISINOPRIL/HYDROCHLOROTHIAZIDE 10-12.5 MG
1 TABLET ORAL DAILY
Qty: 30 TABLET | Refills: 4 | Status: SHIPPED | OUTPATIENT
Start: 2020-09-03

## 2020-09-03 ASSESSMENT — PAIN SCALES - GENERAL: PAINLEVEL: NO PAIN (0)

## 2020-09-03 ASSESSMENT — MIFFLIN-ST. JEOR: SCORE: 1738.48

## 2020-09-03 NOTE — PATIENT INSTRUCTIONS
[facility] [place] Medication Refill Request Information:  * Please contact your pharmacy regarding ANY request for medication refills.  ** [facility] Prescription Fax = [phone]  * Please allow 3 business days for routine medication refills.  * Please allow 5 business days for controlled substance medication refills.     [facility] [place] Test Result notification information:  *You will be notified with in 7-10 days of your appointment day regarding the results of your test.  If you are on MyChart you will be notified as soon as the provider has reviewed the results and signed off on them.    [facility] [place]: [phone]

## 2020-09-03 NOTE — PROGRESS NOTES
"S: Unique \"Suyapa\" Bladimir is seen today for her annual physical. FAIZAN: 9/3/2020.  We had a virtual appt. On 20 to discuss restarting her Adderall.  It was re-ordered.  Today she states she has not noticed much change in her concentration ability.   She does report a mild loss of appetite, some weight loss (25 pounds since 20) w/o effort, fatigue, chill, heat/cold intolerance, increased stress, pruritis.         Patient Active Problem List   Diagnosis     ADHD (attention deficit hyperactivity disorder), diagnosed      Adjustment disorder with depressed mood     Obesity     Calcaneal spur, left     Pain in joint, shoulder region     Medication refill- do not delete      Cobblestone retinal degeneration     Myopia     Occlusion amblyopia     Stress reaction of bone            Past Medical History:   Diagnosis Date     ADD (attention deficit disorder)      Amblyopia     left eye     Anxiety      Depression     Resolved     Insomnia      Perimenopausal      Tendonitis, bicipital 2012    left     Uterine fibroid             Past Surgical History:   Procedure Laterality Date     ARTHROSCOPY SHOULDER ROTATOR CUFF REPAIR Right      C ORAL SURGERY SINGLE TOOTH       gum grafting       LASIK CUSTOMVUE BILATERAL  2014    Procedure: LASIK CUSTOMVUE BILATERAL;  BILATERAL CUSTOMVUE LASIK WITH INTRALASE ;  Surgeon: Juve Hill MD;  Location: [facility]     TONSILLECTOMY       WAVESCAN SCREENING  2014    Procedure: WAVESCAN SCREENING;  BILATERAL WAVESCAN ;  Surgeon: Juve Hill MD;  Location: [facility]            Social History     Tobacco Use     Smoking status: Former Smoker     Packs/day: 0.50     Years: 30.00     Pack years: 15.00     Types: Cigarettes     Last attempt to quit: 2013     Years since quittin.2     Smokeless tobacco: Never Used     Tobacco comment: Pt is vaping currently   Substance Use Topics     Alcohol use: Yes     Comment: 5-7 beers/week            Family " History   Problem Relation Age of Onset     C.A.D. Father         angioplasty x 2     Prostate Cancer Father      Hypertension Father      Lipids Father      Cancer Brother         lymphoma  30s     Arthritis Paternal Grandmother      Depression Sister      Osteoporosis Mother      Cancer Mother         lymphoma     Cancer Brother      Thyroid Disease No family hx of      Neurologic Disorder No family hx of      Musculoskeletal Disorder No family hx of      Glaucoma No family hx of      Macular Degeneration No family hx of                Allergies   Allergen Reactions     Morphine Hcl Itching            Current Outpatient Medications   Medication Sig Dispense Refill     amphetamine-dextroamphetamine (ADDERALL) 30 MG tablet Take 1 tablet (30 mg) by mouth 2 times daily 60 tablet 0     B Complex CAPS        Lactobacillus (PROBIOTIC ACIDOPHILUS) CAPS        MAGNESIUM PO        PARoxetine (PAXIL) 40 MG tablet Take 1 tablet (40 mg) by mouth every morning 30 tablet 3     PROGESTERONE 100MG/G CREAM Apply 0.5 g topically 2 times daily. 30 g 6     VITAMIN D, CHOLECALCIFEROL, PO Take by mouth daily       aspirin (ASPIRIN 81) 81 MG chewable tablet Take 81 mg by mouth daily       Blood Pressure Monitoring (BLOOD PRESSURE KIT) KIT Check home blood pressure daily and keep list for MD visits (Patient not taking: Reported on 8/11/2020) 1 kit 0     erythromycin (ROMYCIN) 5 MG/GM ophthalmic ointment Place 0.5 inches into the right eye 3 times daily (Patient not taking: Reported on 8/11/2020) 3.5 g 0        ROS:   CONSTITUTIONAL:fatigue.  Unexpected weight loss.   SKIN: she has pruritis and states she has a rash that comes and goes.   EYES: no acute vision problems or changes.She had Lasik surgery 3 years ago.  Uses reading glasses.   ENT: no ear problems, no mouth problems, no throat problems.  Dental is UTD.   RESP: no significant cough, no shortness of breath  BREAST: no masses, no tenderness, no discharge.   CV: she thinks her BP  "has been higher then previous.  No new or worsening peripheral edema  GI: no nausea, no vomiting, no constipation, no diarrhea  : no frequency, no dysuria, no hematuria. Pap:2015. .     female: .  LMP 2015.  MS:  normal.   NEURO: no weakness, no dizziness, no syncope, no headaches.  She has numbness in the left thigh and pelvis since had the low back pain altho the pain has resolved.  ENDOCRINE: no temperature intolerance, no skin/hair changes  HEME: no easy bruising, no bleeding problems  PSYCHIATRIC:She is using her antidepressants as ordered.    Has always had sleep issues.  Uses Diphenhydramine with little benefit/   See PHQ-9: 3  Takes antihistamine at HS.   Takes Paroxetine rarely, ie: before going to funerals or sad events.  She takes Adderall.          O:   BP (!) 150/86   Pulse 94   Ht 1.702 m (5' 7\")   Wt 111.6 kg (246 lb)   LMP 2016   SpO2 98%   BMI 38.53 kg/m    Constitutional: no distress, comfortable, pleasant   Eyes: anicteric,conjunctiva pink, PERRLA. Glasses.  Ears, Nose and Throat: tympanic mmbranes clear,  throat clear.   Neck: supple with full range of motion, no thyromegaly.   Cardiovascular: regular rate and rhythm, normal S1 and S2, no murmurs, rubs or gallops, peripheral pulses full and symmetric.   Respiratory: clear to auscultation, no wheezes or crackles, normal breath sounds.  Gastrointestinal: positive bowel sounds, nontender, no hepatosplenomegaly, no masses   Musculoskeletal: full range of motion, no edema.  Bilateral 2nd toe hammertoes.  First toe each foot has bunion on anterior DIP joints.    Lymphatic: no cervical, axillary or inguinal lymphadenopathy  Breasts: normal without suspicious masses, skin changes or axillary nodes, self exam is taught and encouraged.  Gentiourinary:Pelvic Exam:  Vulva: No external lesions, normal hair distribution, no adenopathy  Vagina: Moist, pink, no abnormal discharge, well rugated, no lesions  Cervix: Pap smear is taken, " parous, smooth, pink, no visible lesions  Uterus: Normal size, anteverted, non-tender, mobile  Ovaries: No mass, non-tender, mobile  Rectal exam: No mass, non-tender, normal sphincter tone, hemoccult negative  Skin: no concerning lesions, no jaundice, temp normal   Neurological:  normal gait, normal speech, no tremor   Psychological: appropriate mood.       A/P:  Unique was seen today for physical.    Diagnoses and all orders for this visit:    Screening for cervical cancer  -     Pap imaged thin layer screen with HPV - recommended age 30 - 65 years (select HPV order below)    Adjustment disorder with depressed mood  -     PARoxetine (PAXIL) 40 MG tablet; Take 1 tablet (40 mg) by mouth every morning    Weight loss  -     TSH; Future  -     Erythrocyte sedimentation rate; Future    Essential hypertension  -     lisinopril-hydrochlorothiazide (ZESTORETIC) 10-12.5 MG tablet; Take 1 tablet by mouth daily.  This is a new medication.   -     Basic metabolic panel; Future    ADHD (attention deficit hyperactivity disorder), combined type  -     amphetamine-dextroamphetamine (ADDERALL) 30 MG tablet; Take 1 tablet (30 mg) by mouth 2 times daily      Total time spent 40 minutes.  More than 50% of the time spent with Ms. Garrison on counseling / coordinating her care.    Bria GARCÍA, CNP                Answers for HPI/ROS submitted by the patient on 9/2/2020   General Symptoms: Yes  Skin Symptoms: Yes  HENT Symptoms: No  EYE SYMPTOMS: No  HEART SYMPTOMS: Yes  LUNG SYMPTOMS: No  INTESTINAL SYMPTOMS: No  URINARY SYMPTOMS: No  GYNECOLOGIC SYMPTOMS: No  BREAST SYMPTOMS: No  SKELETAL SYMPTOMS: Yes  BLOOD SYMPTOMS: No  NERVOUS SYSTEM SYMPTOMS: No  MENTAL HEALTH SYMPTOMS: Yes  Fever: No  Loss of appetite: Yes  Weight loss: Yes  Weight gain: No  Fatigue: Yes  Night sweats: No  Chills: Yes  Increased stress: Yes  Excessive hunger: No  Excessive thirst: No  Feeling hot or cold when others believe the temperature is normal: Yes  Loss  of height: No  Post-operative complications: No  Surgical site pain: No  Hallucinations: No  Change in or Loss of Energy: Yes  Hyperactivity: No  Confusion: No  Changes in hair: No  Changes in moles/birth marks: No  Itching: Yes  Rashes: No  Changes in nails: No  Acne: No  Hair in places you don't want it: No  Change in facial hair: No  Warts: No  Non-healing sores: No  Scarring: No  Flaking of skin: No  Color changes of hands/feet in cold : No  Sun sensitivity: No  Skin thickening: No  Chest pain or pressure: No  Fast or irregular heartbeat: No  Pain in legs with walking: No  Trouble breathing while lying down: No  Fingers or toes appear blue: No  High blood pressure: No  Low blood pressure: No  Fainting: No  Murmurs: No  Pacemaker: No  Varicose veins: No  Edema or swelling: Yes  Wake up at night with shortness of breath: No  Light-headedness: No  Exercise intolerance: No  Back pain: No  Muscle aches: Yes  Neck pain: No  Swollen joints: Yes  Joint pain: No  Bone pain: No  Muscle cramps: Yes  Muscle weakness: No  Joint stiffness: No  Bone fracture: No  Nervous or Anxious: Yes  Depression: Yes  Trouble sleeping: Yes  Trouble thinking or concentrating: Yes  Mood changes: No  Panic attacks: No

## 2020-09-03 NOTE — NURSING NOTE
Chief Complaint   Patient presents with     Physical     pt here for physical       Kimmie Castro CMA, EMT at 9:55 AM on 9/3/2020.

## 2020-09-09 ENCOUNTER — NURSE TRIAGE (OUTPATIENT)
Dept: NURSING | Facility: CLINIC | Age: 55
End: 2020-09-09

## 2020-09-09 NOTE — TELEPHONE ENCOUNTER
She fell backwards, landing on her buttocks. She has pain with movement and is using ibuprofen which helps. I connected with scheduling to set up an appointment today.  Hyun Cohen RN  [facility]      Additional Information    Negative: Dangerous mechanism of injury (e.g., MVA, contact sports, trampoline, diving, fall > 10 feet or 3 meters) (EXCEPTION: back pain began > 1 hour after injury)    Negative: Weakness (i.e., paralysis, loss of muscle strength) of the leg(s) or foot and sudden onset after back injury    Negative: Numbness (i.e., loss of sensation) of the leg(s) or foot and sudden onset after back injury    Negative: Major bleeding (actively dripping or spurting) that can't be stopped    Negative: Bullet, knife or other serious penetrating wound    Negative: Shock suspected (e.g., cold/pale/clammy skin, too weak to stand, low BP, rapid pulse)    Negative: Sounds like a life-threatening emergency to the triager    Negative: Back pain not from an injury    Negative: Back pain from overuse (work, exercise, gardening) OR from twisting, lifting, or bending injury    Negative: SEVERE pain in kidney area (flank) that follows a direct blow to that site    Negative: Blood in urine (red, pink, or tea-colored)    Negative: Unable to urinate (or only a few drops) > 4 hours and bladder feels very full (e.g., palpable bladder or strong urge to urinate)    Negative: Loss of bladder or bowel control (urine or bowel incontinence; wetting self, leaking stool) of new onset    Negative: Numbness (loss of sensation) in groin or rectal area    Negative: Skin is split open or gaping (length > 1/2 inch or 12 mm)    Negative: Puncture wound of back    Negative: Bleeding won't stop after 10 minutes of direct pressure (using correct technique)    Negative: Sounds like a serious injury to the triager    Negative: Weakness of a leg or foot (e.g., unable to bear weight, dragging foot)    Negative: Numbness of a leg or  foot (i.e.., loss of sensation)    Negative: SEVERE back pain (e.g., excruciating, unable to do any normal activities) and not improved after pain medicine and CARE ADVICE     Pain at rest zero, with movement at 6, using ibuprofen    Negative: Pain radiates into the thigh or further down the leg now    Landed hard on feet or buttocks and pain over spine    Protocols used: BACK INJURY-A-[place]

## 2020-09-10 LAB
COPATH REPORT: NORMAL
PAP: NORMAL

## 2020-09-14 LAB
FINAL DIAGNOSIS: NORMAL
HPV HR 12 DNA CVX QL NAA+PROBE: NEGATIVE
HPV16 DNA SPEC QL NAA+PROBE: NEGATIVE
HPV18 DNA SPEC QL NAA+PROBE: NEGATIVE
SPECIMEN DESCRIPTION: NORMAL
SPECIMEN SOURCE CVX/VAG CYTO: NORMAL

## 2020-11-29 ENCOUNTER — HEALTH MAINTENANCE LETTER (OUTPATIENT)
Age: 55
End: 2020-11-29

## 2021-09-25 ENCOUNTER — HEALTH MAINTENANCE LETTER (OUTPATIENT)
Age: 56
End: 2021-09-25

## 2021-11-20 ENCOUNTER — HEALTH MAINTENANCE LETTER (OUTPATIENT)
Age: 56
End: 2021-11-20

## 2022-01-15 ENCOUNTER — HEALTH MAINTENANCE LETTER (OUTPATIENT)
Age: 57
End: 2022-01-15

## 2022-12-26 ENCOUNTER — HEALTH MAINTENANCE LETTER (OUTPATIENT)
Age: 57
End: 2022-12-26

## 2023-04-16 ENCOUNTER — HEALTH MAINTENANCE LETTER (OUTPATIENT)
Age: 58
End: 2023-04-16

## 2024-02-04 ENCOUNTER — HEALTH MAINTENANCE LETTER (OUTPATIENT)
Age: 59
End: 2024-02-04